# Patient Record
Sex: FEMALE | Race: WHITE | Employment: OTHER | URBAN - METROPOLITAN AREA
[De-identification: names, ages, dates, MRNs, and addresses within clinical notes are randomized per-mention and may not be internally consistent; named-entity substitution may affect disease eponyms.]

---

## 2017-01-06 DIAGNOSIS — E78.5 HYPERLIPIDEMIA WITH TARGET LDL LESS THAN 70: ICD-10-CM

## 2017-01-06 DIAGNOSIS — I77.9 PERIPHERAL ARTERIAL OCCLUSIVE DISEASE (HCC): ICD-10-CM

## 2017-01-06 DIAGNOSIS — F41.9 ANXIETY AND DEPRESSION: ICD-10-CM

## 2017-01-06 DIAGNOSIS — F32.A ANXIETY AND DEPRESSION: ICD-10-CM

## 2017-01-06 DIAGNOSIS — G62.9 NEUROPATHY: ICD-10-CM

## 2017-01-06 DIAGNOSIS — Z72.0 TOBACCO ABUSE: Primary | ICD-10-CM

## 2017-01-06 RX ORDER — CITALOPRAM 10 MG/1
TABLET ORAL
Qty: 90 TABLET | Refills: 1 | Status: SHIPPED | OUTPATIENT
Start: 2017-01-06 | End: 2017-04-03

## 2017-01-06 RX ORDER — AMLODIPINE BESYLATE 10 MG/1
10 TABLET ORAL
Qty: 90 TABLET | Refills: 1 | Status: SHIPPED | OUTPATIENT
Start: 2017-01-06 | End: 2017-07-13

## 2017-01-06 RX ORDER — LISINOPRIL 40 MG/1
40 TABLET ORAL DAILY
Qty: 90 TABLET | Refills: 1 | Status: SHIPPED | OUTPATIENT
Start: 2017-01-06 | End: 2017-09-07

## 2017-04-03 RX ORDER — CITALOPRAM 10 MG/1
TABLET ORAL
Qty: 90 TABLET | Refills: 1 | Status: SHIPPED | OUTPATIENT
Start: 2017-04-03 | End: 2018-01-08

## 2017-05-01 ENCOUNTER — OFFICE VISIT (OUTPATIENT)
Dept: INTERNAL MEDICINE CLINIC | Facility: CLINIC | Age: 82
End: 2017-05-01

## 2017-05-01 ENCOUNTER — APPOINTMENT (OUTPATIENT)
Dept: LAB | Age: 82
End: 2017-05-01
Attending: INTERNAL MEDICINE
Payer: MEDICARE

## 2017-05-01 VITALS
DIASTOLIC BLOOD PRESSURE: 50 MMHG | OXYGEN SATURATION: 99 % | SYSTOLIC BLOOD PRESSURE: 134 MMHG | BODY MASS INDEX: 23.6 KG/M2 | HEART RATE: 65 BPM | TEMPERATURE: 98 F | HEIGHT: 61 IN | WEIGHT: 125 LBS | RESPIRATION RATE: 16 BRPM

## 2017-05-01 DIAGNOSIS — F41.1 GENERALIZED ANXIETY DISORDER: ICD-10-CM

## 2017-05-01 DIAGNOSIS — G62.9 NEUROPATHY: ICD-10-CM

## 2017-05-01 DIAGNOSIS — I77.9 PERIPHERAL ARTERIAL OCCLUSIVE DISEASE (HCC): ICD-10-CM

## 2017-05-01 DIAGNOSIS — I15.0 RENOVASCULAR HYPERTENSION: Primary | ICD-10-CM

## 2017-05-01 DIAGNOSIS — I15.0 RENOVASCULAR HYPERTENSION: ICD-10-CM

## 2017-05-01 DIAGNOSIS — E78.5 HYPERLIPIDEMIA WITH TARGET LDL LESS THAN 70: ICD-10-CM

## 2017-05-01 DIAGNOSIS — Z89.512 STATUS POST BELOW KNEE AMPUTATION OF LEFT LOWER EXTREMITY: ICD-10-CM

## 2017-05-01 PROCEDURE — 84450 TRANSFERASE (AST) (SGOT): CPT

## 2017-05-01 PROCEDURE — 80048 BASIC METABOLIC PNL TOTAL CA: CPT

## 2017-05-01 PROCEDURE — 99214 OFFICE O/P EST MOD 30 MIN: CPT | Performed by: INTERNAL MEDICINE

## 2017-05-01 PROCEDURE — 36415 COLL VENOUS BLD VENIPUNCTURE: CPT

## 2017-05-01 PROCEDURE — 84460 ALANINE AMINO (ALT) (SGPT): CPT

## 2017-05-01 NOTE — PROGRESS NOTES
Christopher Servin is a 80year old female. HPI:   Patient presents for the following issues. 1. HTN: tolerating metoprolol, lisinopril, norvasc well. 2. HLP: tolerating lipitor well  3.  Anxiety: doing well on citalopram. She states her nervousness i disease) ([facility]    • Ovarian cancer ([facility] 1962     R ovary removed, cannot remember for sure.  No xrt/radiation   • Macular degeneration of both eyes    • Glaucoma           Past Surgical History    OTHER SURGICAL HISTORY Left     Comment BKA for PVDz      S Cancer Screening: declines colonoscopy  Breast Cancer Screening: declines breast cancer screening  Bone Health: DEXA ordered. Educated on calcium/vit D3, weight bearing exercises  Vaccines: Pneumovac 2014. Prevnar 13 in 2016. Educated on TDAP and shingles.

## 2017-07-13 ENCOUNTER — TELEPHONE (OUTPATIENT)
Dept: INTERNAL MEDICINE CLINIC | Facility: CLINIC | Age: 82
End: 2017-07-13

## 2017-07-13 DIAGNOSIS — F41.9 ANXIETY AND DEPRESSION: ICD-10-CM

## 2017-07-13 DIAGNOSIS — G62.9 NEUROPATHY: ICD-10-CM

## 2017-07-13 DIAGNOSIS — Z72.0 TOBACCO ABUSE: ICD-10-CM

## 2017-07-13 DIAGNOSIS — F32.A ANXIETY AND DEPRESSION: ICD-10-CM

## 2017-07-13 DIAGNOSIS — E78.5 HYPERLIPIDEMIA WITH TARGET LDL LESS THAN 70: ICD-10-CM

## 2017-07-13 DIAGNOSIS — I77.9 PERIPHERAL ARTERIAL OCCLUSIVE DISEASE (HCC): ICD-10-CM

## 2017-07-13 RX ORDER — AMLODIPINE BESYLATE 10 MG/1
10 TABLET ORAL
Qty: 90 TABLET | Refills: 1 | Status: SHIPPED | OUTPATIENT
Start: 2017-07-13 | End: 2018-01-08

## 2017-07-31 DIAGNOSIS — F41.9 ANXIETY AND DEPRESSION: ICD-10-CM

## 2017-07-31 DIAGNOSIS — F32.A ANXIETY AND DEPRESSION: ICD-10-CM

## 2017-07-31 DIAGNOSIS — G62.9 NEUROPATHY: ICD-10-CM

## 2017-07-31 DIAGNOSIS — E78.5 HYPERLIPIDEMIA WITH TARGET LDL LESS THAN 70: ICD-10-CM

## 2017-07-31 DIAGNOSIS — Z72.0 TOBACCO ABUSE: ICD-10-CM

## 2017-07-31 DIAGNOSIS — I77.9 PERIPHERAL ARTERIAL OCCLUSIVE DISEASE (HCC): ICD-10-CM

## 2017-07-31 RX ORDER — ATORVASTATIN CALCIUM 40 MG/1
TABLET, FILM COATED ORAL
Qty: 90 TABLET | Refills: 3 | Status: SHIPPED | OUTPATIENT
Start: 2017-07-31 | End: 2018-08-18

## 2017-09-07 DIAGNOSIS — Z72.0 TOBACCO ABUSE: ICD-10-CM

## 2017-09-07 DIAGNOSIS — F41.9 ANXIETY AND DEPRESSION: ICD-10-CM

## 2017-09-07 DIAGNOSIS — F32.A ANXIETY AND DEPRESSION: ICD-10-CM

## 2017-09-07 DIAGNOSIS — E78.5 HYPERLIPIDEMIA WITH TARGET LDL LESS THAN 70: ICD-10-CM

## 2017-09-07 DIAGNOSIS — G62.9 NEUROPATHY: ICD-10-CM

## 2017-09-07 DIAGNOSIS — I77.9 PERIPHERAL ARTERIAL OCCLUSIVE DISEASE (HCC): ICD-10-CM

## 2017-09-07 RX ORDER — LISINOPRIL 40 MG/1
40 TABLET ORAL DAILY
Qty: 90 TABLET | Refills: 1 | Status: SHIPPED | OUTPATIENT
Start: 2017-09-07 | End: 2018-03-11

## 2017-10-05 NOTE — PATIENT INSTRUCTIONS
Please have your blood-work done as soon as possible.     You can return in November, 2017 for a medicare wellness exam. Access: peripheral only   F: No IVF.   E: Maintain K>4, Mg>2  N: Dash diet  DVT PPX: restart eliquis after procedure   Full code   CCU

## 2017-11-06 ENCOUNTER — TELEPHONE (OUTPATIENT)
Dept: INTERNAL MEDICINE CLINIC | Facility: CLINIC | Age: 82
End: 2017-11-06

## 2017-11-06 NOTE — TELEPHONE ENCOUNTER
Patient called same day to cancel appointment. Notified of 33-AUK cancellation policy and stated that this will be considered a NO SHOW. Stated that after a second no show within the year, there will be a $50 charge.   Patient rescheduled  Future Appointme

## 2017-11-13 ENCOUNTER — OFFICE VISIT (OUTPATIENT)
Dept: INTERNAL MEDICINE CLINIC | Facility: CLINIC | Age: 82
End: 2017-11-13

## 2017-11-13 ENCOUNTER — APPOINTMENT (OUTPATIENT)
Dept: LAB | Age: 82
End: 2017-11-13
Attending: PHYSICIAN ASSISTANT
Payer: MEDICARE

## 2017-11-13 VITALS
OXYGEN SATURATION: 97 % | HEIGHT: 61 IN | WEIGHT: 128.5 LBS | BODY MASS INDEX: 24.26 KG/M2 | SYSTOLIC BLOOD PRESSURE: 146 MMHG | TEMPERATURE: 98 F | RESPIRATION RATE: 12 BRPM | DIASTOLIC BLOOD PRESSURE: 50 MMHG | HEART RATE: 62 BPM

## 2017-11-13 DIAGNOSIS — Z89.512 STATUS POST BELOW KNEE AMPUTATION OF LEFT LOWER EXTREMITY: ICD-10-CM

## 2017-11-13 DIAGNOSIS — I77.9 PERIPHERAL ARTERIAL OCCLUSIVE DISEASE (HCC): ICD-10-CM

## 2017-11-13 DIAGNOSIS — Z00.00 ENCOUNTER FOR ANNUAL HEALTH EXAMINATION: Primary | ICD-10-CM

## 2017-11-13 DIAGNOSIS — I15.0 RENOVASCULAR HYPERTENSION: ICD-10-CM

## 2017-11-13 DIAGNOSIS — G62.9 NEUROPATHY: ICD-10-CM

## 2017-11-13 DIAGNOSIS — F41.1 GENERALIZED ANXIETY DISORDER: ICD-10-CM

## 2017-11-13 DIAGNOSIS — Z13.31 DEPRESSION SCREENING: ICD-10-CM

## 2017-11-13 DIAGNOSIS — E78.5 HYPERLIPIDEMIA WITH TARGET LDL LESS THAN 70: ICD-10-CM

## 2017-11-13 DIAGNOSIS — Z00.00 ENCOUNTER FOR ANNUAL HEALTH EXAMINATION: ICD-10-CM

## 2017-11-13 PROCEDURE — G0444 DEPRESSION SCREEN ANNUAL: HCPCS | Performed by: PHYSICIAN ASSISTANT

## 2017-11-13 PROCEDURE — G0439 PPPS, SUBSEQ VISIT: HCPCS | Performed by: PHYSICIAN ASSISTANT

## 2017-11-13 PROCEDURE — 80048 BASIC METABOLIC PNL TOTAL CA: CPT

## 2017-11-13 PROCEDURE — 84450 TRANSFERASE (AST) (SGOT): CPT

## 2017-11-13 PROCEDURE — G0008 ADMIN INFLUENZA VIRUS VAC: HCPCS | Performed by: PHYSICIAN ASSISTANT

## 2017-11-13 PROCEDURE — 90653 IIV ADJUVANT VACCINE IM: CPT | Performed by: PHYSICIAN ASSISTANT

## 2017-11-13 PROCEDURE — 36415 COLL VENOUS BLD VENIPUNCTURE: CPT

## 2017-11-13 PROCEDURE — 80061 LIPID PANEL: CPT

## 2017-11-13 PROCEDURE — 84460 ALANINE AMINO (ALT) (SGPT): CPT

## 2017-11-13 NOTE — PROGRESS NOTES
HPI:   Clare Gallardo is a 80year old female who presents for a Medicare Subsequent Annual Wellness visit (Pt already had Initial Annual Wellness). HTN & HLP - compliant with meds, no SEs. Home BP readings in the 120-130s/60-70s. Mood - stable. Dorzolamide HCl-Timolol Mal (DORZOLAMIDE-TIMOLOL) 22.3-6.8 MG/ML Ophthalmic Solution    Multiple Vitamins-Minerals (PRESERVISION AREDS) Oral Tab Take  by mouth.   latanoprost (XALATAN) 0.005 % Ophthalmic Solution Place 1 drop into both eyes daily.    aspi Hearing Assessment  (Required for AWV/SWV)    Finger Rub     Visual Acuity  Right Eye Visual Acuity: Uncorrected Right Eye Chart Acuity: 20/200   Left Eye Visual Acuity: Uncorrected Left Eye Chart Acuity: 20/200   Both Eyes Visual Acuity: Uncorrected Both  on file in [facility]:    Red Fajardo does not have a Power of  for [employer] on file in [address] [place] [address] Rd. Discussed with patient and provided information           PLAN:  The patient indicates understanding of these issues and agrees to the plan. weeks)?: Not at all    PHQ-2 SCORE: 0        Advance Directives     Do you have a healthcare power of ?: Yes    Do you have a living will?: Yes     Please go to \"Cognitive Assessment\" under Medicare Assessment section in Charting, test patient an Pap: Every 3 yrs age 21-65 or Pap+HPV every 5 yrs age 33-67, age 72 and older at high risk There are no preventive care reminders to display for this patient.  Update Health Maintenance if applicable    Chlamydia  Annually if high risk No results found f Creatinine (mg/dL)   Date Value   05/01/2017 0.94    No flowsheet data found. BUN  Annually BUN (mg/dL)   Date Value   05/01/2017 21 (H)   12/02/2013 10    No flowsheet data found.      Drug Serum Conc  Annually No results found for: DIGOXIN, DIG, [place] Educated on TDAP and shingles. Gets annual flu shot. Healthcare Living Will, Medical POA:  DNR/DNI.   Son is main contact: Teresa Cárdenas Team:  Cardiology- Dr. Alexandra Zhang  Vascular- Dr. Luz Lake    The patient indicates understanding of the

## 2017-11-13 NOTE — PATIENT INSTRUCTIONS
Continue current medications. Try to quit smoking with your son. Follow up visit in 6 months (May 2017). Recommended Websites for Advanced Directives    [email or web address] org/[email or web address]. pdf  An information packet, including

## 2018-01-08 DIAGNOSIS — E78.5 HYPERLIPIDEMIA WITH TARGET LDL LESS THAN 70: ICD-10-CM

## 2018-01-08 DIAGNOSIS — F41.9 ANXIETY AND DEPRESSION: ICD-10-CM

## 2018-01-08 DIAGNOSIS — I77.9 PERIPHERAL ARTERIAL OCCLUSIVE DISEASE (HCC): ICD-10-CM

## 2018-01-08 DIAGNOSIS — F32.A ANXIETY AND DEPRESSION: ICD-10-CM

## 2018-01-08 DIAGNOSIS — Z72.0 TOBACCO ABUSE: ICD-10-CM

## 2018-01-08 DIAGNOSIS — G62.9 NEUROPATHY: ICD-10-CM

## 2018-01-09 RX ORDER — CITALOPRAM 10 MG/1
TABLET ORAL
Qty: 90 TABLET | Refills: 1 | Status: SHIPPED | OUTPATIENT
Start: 2018-01-09 | End: 2018-08-28

## 2018-01-09 RX ORDER — AMLODIPINE BESYLATE 10 MG/1
10 TABLET ORAL
Qty: 90 TABLET | Refills: 1 | Status: SHIPPED | OUTPATIENT
Start: 2018-01-09 | End: 2018-06-15

## 2018-03-11 DIAGNOSIS — Z72.0 TOBACCO ABUSE: ICD-10-CM

## 2018-03-11 DIAGNOSIS — I77.9 PERIPHERAL ARTERIAL OCCLUSIVE DISEASE (HCC): ICD-10-CM

## 2018-03-11 DIAGNOSIS — F32.A ANXIETY AND DEPRESSION: ICD-10-CM

## 2018-03-11 DIAGNOSIS — G62.9 NEUROPATHY: ICD-10-CM

## 2018-03-11 DIAGNOSIS — E78.5 HYPERLIPIDEMIA WITH TARGET LDL LESS THAN 70: ICD-10-CM

## 2018-03-11 DIAGNOSIS — F41.9 ANXIETY AND DEPRESSION: ICD-10-CM

## 2018-03-13 RX ORDER — LISINOPRIL 40 MG/1
40 TABLET ORAL DAILY
Qty: 90 TABLET | Refills: 1 | Status: SHIPPED | OUTPATIENT
Start: 2018-03-13 | End: 2018-08-27

## 2018-05-14 ENCOUNTER — APPOINTMENT (OUTPATIENT)
Dept: LAB | Age: 83
End: 2018-05-14
Attending: INTERNAL MEDICINE
Payer: MEDICARE

## 2018-05-14 ENCOUNTER — OFFICE VISIT (OUTPATIENT)
Dept: INTERNAL MEDICINE CLINIC | Facility: CLINIC | Age: 83
End: 2018-05-14

## 2018-05-14 VITALS
SYSTOLIC BLOOD PRESSURE: 138 MMHG | RESPIRATION RATE: 16 BRPM | BODY MASS INDEX: 25.49 KG/M2 | HEART RATE: 60 BPM | DIASTOLIC BLOOD PRESSURE: 40 MMHG | TEMPERATURE: 99 F | HEIGHT: 61 IN | WEIGHT: 135 LBS

## 2018-05-14 DIAGNOSIS — F41.1 GENERALIZED ANXIETY DISORDER: ICD-10-CM

## 2018-05-14 DIAGNOSIS — F17.200 TOBACCO DEPENDENCE: ICD-10-CM

## 2018-05-14 DIAGNOSIS — I10 ESSENTIAL HYPERTENSION: Primary | ICD-10-CM

## 2018-05-14 DIAGNOSIS — I10 ESSENTIAL HYPERTENSION: ICD-10-CM

## 2018-05-14 DIAGNOSIS — I77.9 PERIPHERAL ARTERIAL OCCLUSIVE DISEASE (HCC): ICD-10-CM

## 2018-05-14 DIAGNOSIS — Z89.512 STATUS POST BELOW KNEE AMPUTATION OF LEFT LOWER EXTREMITY: ICD-10-CM

## 2018-05-14 DIAGNOSIS — E78.5 HYPERLIPIDEMIA WITH TARGET LDL LESS THAN 70: ICD-10-CM

## 2018-05-14 PROCEDURE — 84460 ALANINE AMINO (ALT) (SGPT): CPT

## 2018-05-14 PROCEDURE — 99214 OFFICE O/P EST MOD 30 MIN: CPT | Performed by: INTERNAL MEDICINE

## 2018-05-14 PROCEDURE — 36415 COLL VENOUS BLD VENIPUNCTURE: CPT

## 2018-05-14 PROCEDURE — 84450 TRANSFERASE (AST) (SGOT): CPT

## 2018-05-14 PROCEDURE — 80048 BASIC METABOLIC PNL TOTAL CA: CPT

## 2018-05-14 RX ORDER — DORZOLAMIDE HYDROCHLORIDE AND TIMOLOL MALEATE 20; 5 MG/ML; MG/ML
SOLUTION/ DROPS OPHTHALMIC
COMMUNITY
Start: 2017-07-10 | End: 2018-05-14

## 2018-05-14 RX ORDER — LATANOPROST 50 UG/ML
1 SOLUTION/ DROPS OPHTHALMIC
COMMUNITY
Start: 2018-02-08 | End: 2018-05-14

## 2018-05-14 NOTE — PROGRESS NOTES
Morenita Tapia is a 80year old female. HPI:   Patient presents for the following issues. Requesting labs. 1. HTN: home pressures are < 140/90s. Taking all her medications daily. Tolerating them well.  She was very worried about getting in here on Paternal Grandmother      colon cancer   • Cancer Daughter       Past Medical History:   Diagnosis Date   • Glaucoma    • Macular degeneration of both eyes    • Other and unspecified hyperlipidemia    • Ovarian cancer ([facility] [address] 1962    R ovary removed, cannot interested in cessation. Only smoking 1-2 cig per day. Discussed nicotine gum/lozenges. # LLE Neuropathy: since amputation. chronic, able to tolerate. Off gabapentin now  # Ovarian Cancer: s/p surgery in 1962 but cannot remember which side.  Needs yearly

## 2018-05-25 ENCOUNTER — APPOINTMENT (OUTPATIENT)
Dept: LAB | Age: 83
End: 2018-05-25
Attending: INTERNAL MEDICINE
Payer: MEDICARE

## 2018-05-25 DIAGNOSIS — I10 ESSENTIAL HYPERTENSION: ICD-10-CM

## 2018-05-25 DIAGNOSIS — I77.9 PERIPHERAL ARTERIAL OCCLUSIVE DISEASE (HCC): ICD-10-CM

## 2018-05-25 DIAGNOSIS — G62.9 NEUROPATHY: ICD-10-CM

## 2018-05-25 DIAGNOSIS — R73.01 IMPAIRED FASTING GLUCOSE: ICD-10-CM

## 2018-05-25 DIAGNOSIS — F17.200 TOBACCO DEPENDENCE: ICD-10-CM

## 2018-05-25 PROCEDURE — 83036 HEMOGLOBIN GLYCOSYLATED A1C: CPT

## 2018-05-25 PROCEDURE — 36415 COLL VENOUS BLD VENIPUNCTURE: CPT

## 2018-06-15 DIAGNOSIS — E78.5 HYPERLIPIDEMIA WITH TARGET LDL LESS THAN 70: ICD-10-CM

## 2018-06-15 DIAGNOSIS — F32.A ANXIETY AND DEPRESSION: ICD-10-CM

## 2018-06-15 DIAGNOSIS — G62.9 NEUROPATHY: ICD-10-CM

## 2018-06-15 DIAGNOSIS — F41.9 ANXIETY AND DEPRESSION: ICD-10-CM

## 2018-06-15 DIAGNOSIS — Z72.0 TOBACCO ABUSE: ICD-10-CM

## 2018-06-15 DIAGNOSIS — I77.9 PERIPHERAL ARTERIAL OCCLUSIVE DISEASE (HCC): ICD-10-CM

## 2018-06-15 RX ORDER — AMLODIPINE BESYLATE 10 MG/1
10 TABLET ORAL DAILY
Qty: 90 TABLET | Refills: 1 | Status: SHIPPED | OUTPATIENT
Start: 2018-06-15 | End: 2018-08-27

## 2018-06-20 DIAGNOSIS — Z72.0 TOBACCO ABUSE: ICD-10-CM

## 2018-06-20 DIAGNOSIS — F32.A ANXIETY AND DEPRESSION: ICD-10-CM

## 2018-06-20 DIAGNOSIS — G62.9 NEUROPATHY: ICD-10-CM

## 2018-06-20 DIAGNOSIS — F41.9 ANXIETY AND DEPRESSION: ICD-10-CM

## 2018-06-20 DIAGNOSIS — E78.5 HYPERLIPIDEMIA WITH TARGET LDL LESS THAN 70: ICD-10-CM

## 2018-06-20 DIAGNOSIS — I77.9 PERIPHERAL ARTERIAL OCCLUSIVE DISEASE (HCC): ICD-10-CM

## 2018-06-21 RX ORDER — AMLODIPINE BESYLATE 10 MG/1
10 TABLET ORAL DAILY
Qty: 90 TABLET | Refills: 1 | OUTPATIENT
Start: 2018-06-21

## 2018-06-21 NOTE — TELEPHONE ENCOUNTER
Refill requested: Amlodipine Besylate 10 mg     Passed protocol      Last refill: 6/15/18 #90 1R   Relevant Labs: BMP 5/14/18   BP Readings from Last 3 Encounters:  05/14/18 : 138/40  11/13/17 : 146/50  05/01/17 : 134/50      Last OV / RTC advised: 5/14/18

## 2018-08-09 DIAGNOSIS — Z72.0 TOBACCO ABUSE: ICD-10-CM

## 2018-08-09 DIAGNOSIS — F41.9 ANXIETY AND DEPRESSION: ICD-10-CM

## 2018-08-09 DIAGNOSIS — G62.9 NEUROPATHY: ICD-10-CM

## 2018-08-09 DIAGNOSIS — I77.9 PERIPHERAL ARTERIAL OCCLUSIVE DISEASE (HCC): ICD-10-CM

## 2018-08-09 DIAGNOSIS — F32.A ANXIETY AND DEPRESSION: ICD-10-CM

## 2018-08-09 DIAGNOSIS — E78.5 HYPERLIPIDEMIA WITH TARGET LDL LESS THAN 70: ICD-10-CM

## 2018-08-09 RX ORDER — ATORVASTATIN CALCIUM 40 MG/1
TABLET, FILM COATED ORAL
Qty: 90 TABLET | Refills: 2 | OUTPATIENT
Start: 2018-08-09

## 2018-08-18 DIAGNOSIS — I77.9 PERIPHERAL ARTERIAL OCCLUSIVE DISEASE (HCC): ICD-10-CM

## 2018-08-18 DIAGNOSIS — G62.9 NEUROPATHY: ICD-10-CM

## 2018-08-18 DIAGNOSIS — Z72.0 TOBACCO ABUSE: ICD-10-CM

## 2018-08-18 DIAGNOSIS — F32.A ANXIETY AND DEPRESSION: ICD-10-CM

## 2018-08-18 DIAGNOSIS — F41.9 ANXIETY AND DEPRESSION: ICD-10-CM

## 2018-08-18 DIAGNOSIS — E78.5 HYPERLIPIDEMIA WITH TARGET LDL LESS THAN 70: ICD-10-CM

## 2018-08-20 RX ORDER — ATORVASTATIN CALCIUM 40 MG/1
TABLET, FILM COATED ORAL
Qty: 90 TABLET | Refills: 0 | Status: SHIPPED | OUTPATIENT
Start: 2018-08-20 | End: 2018-08-27

## 2018-08-28 ENCOUNTER — HOSPITAL ENCOUNTER (INPATIENT)
Facility: HOSPITAL | Age: 83
LOS: 3 days | Discharge: NURSING, REHAB OR LONG-TERM CARE | DRG: 470 | End: 2018-08-31
Attending: EMERGENCY MEDICINE | Admitting: HOSPITALIST
Payer: MEDICARE

## 2018-08-28 ENCOUNTER — APPOINTMENT (OUTPATIENT)
Dept: GENERAL RADIOLOGY | Facility: HOSPITAL | Age: 83
DRG: 470 | End: 2018-08-28
Attending: EMERGENCY MEDICINE
Payer: MEDICARE

## 2018-08-28 DIAGNOSIS — I73.9 PVD (PERIPHERAL VASCULAR DISEASE) (HCC): ICD-10-CM

## 2018-08-28 DIAGNOSIS — E78.5 HYPERLIPIDEMIA WITH TARGET LDL LESS THAN 70: ICD-10-CM

## 2018-08-28 DIAGNOSIS — I77.9 PERIPHERAL ARTERIAL OCCLUSIVE DISEASE (HCC): ICD-10-CM

## 2018-08-28 DIAGNOSIS — S72.009A HIP FRACTURE (HCC): ICD-10-CM

## 2018-08-28 DIAGNOSIS — Z72.0 TOBACCO ABUSE: ICD-10-CM

## 2018-08-28 DIAGNOSIS — F32.A ANXIETY AND DEPRESSION: ICD-10-CM

## 2018-08-28 DIAGNOSIS — F41.9 ANXIETY AND DEPRESSION: ICD-10-CM

## 2018-08-28 DIAGNOSIS — S72.002A CLOSED FRACTURE OF LEFT HIP, INITIAL ENCOUNTER (HCC): Primary | ICD-10-CM

## 2018-08-28 DIAGNOSIS — G62.9 NEUROPATHY: ICD-10-CM

## 2018-08-28 LAB
ALBUMIN SERPL-MCNC: 3.6 G/DL (ref 3.5–4.8)
ALBUMIN/GLOB SERPL: 0.9 {RATIO} (ref 1–2)
ALP LIVER SERPL-CCNC: 81 U/L (ref 55–142)
ALT SERPL-CCNC: 24 U/L (ref 14–54)
ANION GAP SERPL CALC-SCNC: 11 MMOL/L (ref 0–18)
APTT PPP: 29.5 SECONDS (ref 26.1–34.6)
AST SERPL-CCNC: 24 U/L (ref 15–41)
ATRIAL RATE: 59 BPM
BASOPHILS # BLD AUTO: 0.04 X10(3) UL (ref 0–0.1)
BASOPHILS NFR BLD AUTO: 0.5 %
BILIRUB SERPL-MCNC: 0.4 MG/DL (ref 0.1–2)
BUN BLD-MCNC: 17 MG/DL (ref 8–20)
BUN/CREAT SERPL: 20.5 (ref 10–20)
CALCIUM BLD-MCNC: 9.4 MG/DL (ref 8.3–10.3)
CHLORIDE SERPL-SCNC: 106 MMOL/L (ref 101–111)
CO2 SERPL-SCNC: 23 MMOL/L (ref 22–32)
CREAT BLD-MCNC: 0.83 MG/DL (ref 0.55–1.02)
EOSINOPHIL # BLD AUTO: 0.08 X10(3) UL (ref 0–0.3)
EOSINOPHIL NFR BLD AUTO: 1 %
ERYTHROCYTE [DISTWIDTH] IN BLOOD BY AUTOMATED COUNT: 13.1 % (ref 11.5–16)
GLOBULIN PLAS-MCNC: 3.8 G/DL (ref 2.5–4)
GLUCOSE BLD-MCNC: 107 MG/DL (ref 70–99)
HCT VFR BLD AUTO: 38.1 % (ref 34–50)
HGB BLD-MCNC: 12.7 G/DL (ref 12–16)
IMMATURE GRANULOCYTE COUNT: 0.07 X10(3) UL (ref 0–1)
IMMATURE GRANULOCYTE RATIO %: 0.9 %
INR BLD: 1.06 (ref 0.9–1.1)
LYMPHOCYTES # BLD AUTO: 1.56 X10(3) UL (ref 0.9–4)
LYMPHOCYTES NFR BLD AUTO: 19.1 %
M PROTEIN MFR SERPL ELPH: 7.4 G/DL (ref 6.1–8.3)
MCH RBC QN AUTO: 30.8 PG (ref 27–33.2)
MCHC RBC AUTO-ENTMCNC: 33.3 G/DL (ref 31–37)
MCV RBC AUTO: 92.3 FL (ref 81–100)
MONOCYTES # BLD AUTO: 0.68 X10(3) UL (ref 0.1–1)
MONOCYTES NFR BLD AUTO: 8.3 %
NEUTROPHIL ABS PRELIM: 5.73 X10 (3) UL (ref 1.3–6.7)
NEUTROPHILS # BLD AUTO: 5.73 X10(3) UL (ref 1.3–6.7)
NEUTROPHILS NFR BLD AUTO: 70.2 %
OSMOLALITY SERPL CALC.SUM OF ELEC: 292 MOSM/KG (ref 275–295)
P AXIS: 63 DEGREES
P-R INTERVAL: 132 MS
PLATELET # BLD AUTO: 281 10(3)UL (ref 150–450)
POTASSIUM SERPL-SCNC: 4 MMOL/L (ref 3.6–5.1)
PSA SERPL DL<=0.01 NG/ML-MCNC: 14.2 SECONDS (ref 12.4–14.7)
Q-T INTERVAL: 458 MS
QRS DURATION: 92 MS
QTC CALCULATION (BEZET): 453 MS
R AXIS: -12 DEGREES
RBC # BLD AUTO: 4.13 X10(6)UL (ref 3.8–5.1)
RED CELL DISTRIBUTION WIDTH-SD: 44.5 FL (ref 35.1–46.3)
SODIUM SERPL-SCNC: 140 MMOL/L (ref 136–144)
T AXIS: 43 DEGREES
VENTRICULAR RATE: 59 BPM
WBC # BLD AUTO: 8.2 X10(3) UL (ref 4–13)

## 2018-08-28 PROCEDURE — 73502 X-RAY EXAM HIP UNI 2-3 VIEWS: CPT | Performed by: EMERGENCY MEDICINE

## 2018-08-28 PROCEDURE — 99223 1ST HOSP IP/OBS HIGH 75: CPT | Performed by: HOSPITALIST

## 2018-08-28 PROCEDURE — 71045 X-RAY EXAM CHEST 1 VIEW: CPT | Performed by: EMERGENCY MEDICINE

## 2018-08-28 RX ORDER — MORPHINE SULFATE 4 MG/ML
2 INJECTION, SOLUTION INTRAMUSCULAR; INTRAVENOUS EVERY 4 HOURS PRN
Status: DISCONTINUED | OUTPATIENT
Start: 2018-08-28 | End: 2018-08-29

## 2018-08-28 RX ORDER — MORPHINE SULFATE 4 MG/ML
1-2 INJECTION, SOLUTION INTRAMUSCULAR; INTRAVENOUS EVERY 4 HOURS PRN
Status: DISCONTINUED | OUTPATIENT
Start: 2018-08-28 | End: 2018-08-28 | Stop reason: SDUPTHER

## 2018-08-28 RX ORDER — ATORVASTATIN CALCIUM 40 MG/1
40 TABLET, FILM COATED ORAL NIGHTLY
Status: DISCONTINUED | OUTPATIENT
Start: 2018-08-29 | End: 2018-08-31

## 2018-08-28 RX ORDER — CITALOPRAM 10 MG/1
10 TABLET ORAL DAILY
COMMUNITY
End: 2018-12-06

## 2018-08-28 RX ORDER — ACETAMINOPHEN 325 MG/1
650 TABLET ORAL EVERY 6 HOURS PRN
Status: DISCONTINUED | OUTPATIENT
Start: 2018-08-28 | End: 2018-08-29

## 2018-08-28 RX ORDER — METOCLOPRAMIDE HYDROCHLORIDE 5 MG/ML
5 INJECTION INTRAMUSCULAR; INTRAVENOUS EVERY 8 HOURS PRN
Status: DISCONTINUED | OUTPATIENT
Start: 2018-08-28 | End: 2018-08-31

## 2018-08-28 RX ORDER — AMLODIPINE BESYLATE 5 MG/1
10 TABLET ORAL DAILY
Status: DISCONTINUED | OUTPATIENT
Start: 2018-08-29 | End: 2018-08-31

## 2018-08-28 RX ORDER — SODIUM CHLORIDE, SODIUM LACTATE, POTASSIUM CHLORIDE, CALCIUM CHLORIDE 600; 310; 30; 20 MG/100ML; MG/100ML; MG/100ML; MG/100ML
INJECTION, SOLUTION INTRAVENOUS CONTINUOUS
Status: DISCONTINUED | OUTPATIENT
Start: 2018-08-29 | End: 2018-08-31

## 2018-08-28 RX ORDER — TRAZODONE HYDROCHLORIDE 50 MG/1
50 TABLET ORAL NIGHTLY PRN
Status: DISCONTINUED | OUTPATIENT
Start: 2018-08-28 | End: 2018-08-31

## 2018-08-28 RX ORDER — MORPHINE SULFATE 4 MG/ML
1 INJECTION, SOLUTION INTRAMUSCULAR; INTRAVENOUS EVERY 4 HOURS PRN
Status: DISCONTINUED | OUTPATIENT
Start: 2018-08-28 | End: 2018-08-29

## 2018-08-28 RX ORDER — METOPROLOL TARTRATE 50 MG/1
75 TABLET, FILM COATED ORAL 2 TIMES DAILY
COMMUNITY
End: 2018-12-06

## 2018-08-28 RX ORDER — ATORVASTATIN CALCIUM 40 MG/1
40 TABLET, FILM COATED ORAL NIGHTLY
COMMUNITY
End: 2018-12-06

## 2018-08-28 RX ORDER — ENOXAPARIN SODIUM 100 MG/ML
40 INJECTION SUBCUTANEOUS DAILY
Status: DISCONTINUED | OUTPATIENT
Start: 2018-08-30 | End: 2018-08-31

## 2018-08-28 RX ORDER — ONDANSETRON 2 MG/ML
4 INJECTION INTRAMUSCULAR; INTRAVENOUS EVERY 6 HOURS PRN
Status: DISCONTINUED | OUTPATIENT
Start: 2018-08-28 | End: 2018-08-29

## 2018-08-28 RX ORDER — LATANOPROST 50 UG/ML
1 SOLUTION/ DROPS OPHTHALMIC NIGHTLY
Status: DISCONTINUED | OUTPATIENT
Start: 2018-08-28 | End: 2018-08-31

## 2018-08-28 RX ORDER — ESCITALOPRAM OXALATE 5 MG/1
5 TABLET ORAL DAILY
Status: DISCONTINUED | OUTPATIENT
Start: 2018-08-29 | End: 2018-08-31

## 2018-08-28 RX ORDER — HYDROCODONE BITARTRATE AND ACETAMINOPHEN 5; 325 MG/1; MG/1
1 TABLET ORAL EVERY 6 HOURS PRN
Status: DISCONTINUED | OUTPATIENT
Start: 2018-08-28 | End: 2018-08-29

## 2018-08-28 RX ORDER — DORZOLAMIDE HYDROCHLORIDE AND TIMOLOL MALEATE 20; 5 MG/ML; MG/ML
1 SOLUTION/ DROPS OPHTHALMIC 2 TIMES DAILY
Status: DISCONTINUED | OUTPATIENT
Start: 2018-08-28 | End: 2018-08-31

## 2018-08-28 NOTE — PROGRESS NOTES
[facility] Pharmacy Note:  Renal Dose Adjustment for Metoclopramide (REGLAN)    Argelia Erazo has been prescribed Metoclopramide (REGLAN) 10 mg every 8 hours as needed for n/v.    Estimated Creatinine Clearance: 36.7 mL/min (based on SCr of 0.83 mg/dL).     H

## 2018-08-28 NOTE — H&P
[facility]  History and Physical     Kia Salinas Patient Status:  Emergency    1932 MRN [id number]   Location [facility] Attending Rojas Bess MD   Hosp Day # 0 PCP Jeremias Ramirez MD     Chief Complaint: 3   atorvastatin 40 MG Oral Tab TAKE 1 TABLET BY MOUTH ONE TIME A DAY Disp: 90 tablet Rfl: 3   aspirin 325 MG Oral Tab Take 1 tablet by mouth every morning. Disp:  Rfl:    CITALOPRAM HYDROBROMIDE 10 MG Oral Tab TAKE 1 TABLET (10 MG TOTAL) BY MOUTH DAILY.  D most recent lab result is older than the maximum 7 days allowed. ). No results for input(s): PTP, INR in the last 168 hours. No results for input(s): TROP, CK in the last 168 hours. Imaging: Imaging data reviewed in Epic.       ASSESSMENT / PLAN:

## 2018-08-28 NOTE — ED PROVIDER NOTES
Patient Seen in: [facility] Emergency Department    History   Patient presents with:  Fall (musculoskeletal, neurologic)    Stated Complaint: fall. right groin pain    HPI    79-year-old female presents to the emerge department after a fall.   Rocael negative except as noted above.     Physical Exam   ED Triage Vitals [08/28/18 1331]  BP: 139/51  Pulse: 64  Resp: 16  Temp: 97.7 °F (36.5 °C)  Temp src: Temporal  SpO2: 99 %  O2 Device: None (Room air)    Current:/55 (BP Location: Left arm)   Pulse 6 orders were created for panel order CBC WITH DIFFERENTIAL WITH PLATELET.   Procedure                               Abnormality         Status                     ---------                               -----------         ------                     CBC W/ D Hospitalists and orthopedics        Disposition and Plan     Clinical Impression:  Closed fracture of left hip, initial encounter ([facility] [address]  (primary encounter diagnosis)  PVD (peripheral vascular disease) ([facility] [address]    Disposition:  Admit  8/28/2018  3:24 pm    Fo

## 2018-08-28 NOTE — ED NOTES
Pt report given to Vik Cortes. Pt states she's not in pain as long as she's just lying in bed. Awake and alert. Comfortable.

## 2018-08-28 NOTE — ED INITIAL ASSESSMENT (HPI)
Pt states she was bending and scraping the plate in the garbage. Lost her balance and fell. Pt with pain on her left groin.

## 2018-08-29 ENCOUNTER — APPOINTMENT (OUTPATIENT)
Dept: GENERAL RADIOLOGY | Facility: HOSPITAL | Age: 83
DRG: 470 | End: 2018-08-29
Attending: PHYSICIAN ASSISTANT
Payer: MEDICARE

## 2018-08-29 ENCOUNTER — ANESTHESIA EVENT (OUTPATIENT)
Dept: SURGERY | Facility: HOSPITAL | Age: 83
DRG: 470 | End: 2018-08-29
Payer: MEDICARE

## 2018-08-29 ENCOUNTER — ANESTHESIA (OUTPATIENT)
Dept: SURGERY | Facility: HOSPITAL | Age: 83
DRG: 470 | End: 2018-08-29
Payer: MEDICARE

## 2018-08-29 LAB
ANION GAP SERPL CALC-SCNC: 10 MMOL/L (ref 0–18)
ANTIBODY SCREEN: NEGATIVE
BASOPHILS # BLD AUTO: 0.05 X10(3) UL (ref 0–0.1)
BASOPHILS NFR BLD AUTO: 0.6 %
BILIRUB UR QL STRIP.AUTO: NEGATIVE
BUN BLD-MCNC: 18 MG/DL (ref 8–20)
BUN/CREAT SERPL: 20.9 (ref 10–20)
CALCIUM BLD-MCNC: 8.7 MG/DL (ref 8.3–10.3)
CHLORIDE SERPL-SCNC: 106 MMOL/L (ref 101–111)
CO2 SERPL-SCNC: 22 MMOL/L (ref 22–32)
COLOR UR AUTO: YELLOW
CREAT BLD-MCNC: 0.86 MG/DL (ref 0.55–1.02)
EOSINOPHIL # BLD AUTO: 0.13 X10(3) UL (ref 0–0.3)
EOSINOPHIL NFR BLD AUTO: 1.5 %
ERYTHROCYTE [DISTWIDTH] IN BLOOD BY AUTOMATED COUNT: 13.1 % (ref 11.5–16)
GLUCOSE BLD-MCNC: 115 MG/DL (ref 70–99)
GLUCOSE UR STRIP.AUTO-MCNC: NEGATIVE MG/DL
HCT VFR BLD AUTO: 34.8 % (ref 34–50)
HGB BLD-MCNC: 11.5 G/DL (ref 12–16)
IMMATURE GRANULOCYTE COUNT: 0.05 X10(3) UL (ref 0–1)
IMMATURE GRANULOCYTE RATIO %: 0.6 %
KETONES UR STRIP.AUTO-MCNC: NEGATIVE MG/DL
LYMPHOCYTES # BLD AUTO: 1.59 X10(3) UL (ref 0.9–4)
LYMPHOCYTES NFR BLD AUTO: 18.7 %
MCH RBC QN AUTO: 30.7 PG (ref 27–33.2)
MCHC RBC AUTO-ENTMCNC: 33 G/DL (ref 31–37)
MCV RBC AUTO: 92.8 FL (ref 81–100)
MONOCYTES # BLD AUTO: 0.77 X10(3) UL (ref 0.1–1)
MONOCYTES NFR BLD AUTO: 9 %
NEUTROPHIL ABS PRELIM: 5.93 X10 (3) UL (ref 1.3–6.7)
NEUTROPHILS # BLD AUTO: 5.93 X10(3) UL (ref 1.3–6.7)
NEUTROPHILS NFR BLD AUTO: 69.6 %
NITRITE UR QL STRIP.AUTO: NEGATIVE
OSMOLALITY SERPL CALC.SUM OF ELEC: 289 MOSM/KG (ref 275–295)
PH UR STRIP.AUTO: 5 [PH] (ref 4.5–8)
PLATELET # BLD AUTO: 224 10(3)UL (ref 150–450)
POTASSIUM SERPL-SCNC: 4.1 MMOL/L (ref 3.6–5.1)
PROT UR STRIP.AUTO-MCNC: NEGATIVE MG/DL
RBC # BLD AUTO: 3.75 X10(6)UL (ref 3.8–5.1)
RBC UR QL AUTO: NEGATIVE
RED CELL DISTRIBUTION WIDTH-SD: 44.3 FL (ref 35.1–46.3)
RH BLOOD TYPE: POSITIVE
SODIUM SERPL-SCNC: 138 MMOL/L (ref 136–144)
SP GR UR STRIP.AUTO: 1.01 (ref 1–1.03)
UROBILINOGEN UR STRIP.AUTO-MCNC: <2 MG/DL
WBC # BLD AUTO: 8.5 X10(3) UL (ref 4–13)
WBC #/AREA URNS AUTO: >50 /HPF

## 2018-08-29 PROCEDURE — 3E0T3BZ INTRODUCTION OF ANESTHETIC AGENT INTO PERIPHERAL NERVES AND PLEXI, PERCUTANEOUS APPROACH: ICD-10-PCS | Performed by: ANESTHESIOLOGY

## 2018-08-29 PROCEDURE — 73501 X-RAY EXAM HIP UNI 1 VIEW: CPT | Performed by: PHYSICIAN ASSISTANT

## 2018-08-29 PROCEDURE — 0SRR0J9 REPLACEMENT OF RIGHT HIP JOINT, FEMORAL SURFACE WITH SYNTHETIC SUBSTITUTE, CEMENTED, OPEN APPROACH: ICD-10-PCS | Performed by: ORTHOPAEDIC SURGERY

## 2018-08-29 PROCEDURE — 99232 SBSQ HOSP IP/OBS MODERATE 35: CPT | Performed by: HOSPITALIST

## 2018-08-29 DEVICE — IMPLANTABLE DEVICE: Type: IMPLANTABLE DEVICE | Site: HIP | Status: FUNCTIONAL

## 2018-08-29 DEVICE — VERSYS DISTAL CENTRALIZER 11MM: Type: IMPLANTABLE DEVICE | Site: HIP | Status: FUNCTIONAL

## 2018-08-29 DEVICE — VERSYS CEM LD/FX SZ 11X120MM: Type: IMPLANTABLE DEVICE | Site: HIP | Status: FUNCTIONAL

## 2018-08-29 DEVICE — KIT FEM BONE CEMENT RESTRICTOR: Type: IMPLANTABLE DEVICE | Site: HIP | Status: FUNCTIONAL

## 2018-08-29 DEVICE — REFOBACIN BC R 1X40 US: Type: IMPLANTABLE DEVICE | Site: HIP | Status: FUNCTIONAL

## 2018-08-29 RX ORDER — SODIUM CHLORIDE 9 MG/ML
INJECTION, SOLUTION INTRAVENOUS CONTINUOUS
Status: DISCONTINUED | OUTPATIENT
Start: 2018-08-29 | End: 2018-08-31

## 2018-08-29 RX ORDER — ONDANSETRON 2 MG/ML
4 INJECTION INTRAMUSCULAR; INTRAVENOUS EVERY 4 HOURS PRN
Status: DISCONTINUED | OUTPATIENT
Start: 2018-08-29 | End: 2018-08-31

## 2018-08-29 RX ORDER — SENNOSIDES 8.6 MG
17.2 TABLET ORAL NIGHTLY
Status: DISCONTINUED | OUTPATIENT
Start: 2018-08-29 | End: 2018-08-31

## 2018-08-29 RX ORDER — ONDANSETRON 2 MG/ML
4 INJECTION INTRAMUSCULAR; INTRAVENOUS AS NEEDED
Status: DISCONTINUED | OUTPATIENT
Start: 2018-08-29 | End: 2018-08-29 | Stop reason: HOSPADM

## 2018-08-29 RX ORDER — MEPERIDINE HYDROCHLORIDE 25 MG/ML
12.5 INJECTION INTRAMUSCULAR; INTRAVENOUS; SUBCUTANEOUS AS NEEDED
Status: DISCONTINUED | OUTPATIENT
Start: 2018-08-29 | End: 2018-08-29 | Stop reason: HOSPADM

## 2018-08-29 RX ORDER — OXYCODONE HYDROCHLORIDE 5 MG/1
5 TABLET ORAL EVERY 4 HOURS PRN
Status: DISPENSED | OUTPATIENT
Start: 2018-08-29 | End: 2018-08-31

## 2018-08-29 RX ORDER — CEFAZOLIN SODIUM/WATER 2 G/20 ML
2 SYRINGE (ML) INTRAVENOUS EVERY 8 HOURS
Status: DISCONTINUED | OUTPATIENT
Start: 2018-08-29 | End: 2018-08-29

## 2018-08-29 RX ORDER — DIPHENHYDRAMINE HCL 25 MG
25 CAPSULE ORAL EVERY 4 HOURS PRN
Status: DISCONTINUED | OUTPATIENT
Start: 2018-08-29 | End: 2018-08-29

## 2018-08-29 RX ORDER — ACETAMINOPHEN 325 MG/1
650 TABLET ORAL 4 TIMES DAILY
Status: DISCONTINUED | OUTPATIENT
Start: 2018-08-29 | End: 2018-08-31

## 2018-08-29 RX ORDER — MORPHINE SULFATE 4 MG/ML
1.5 INJECTION, SOLUTION INTRAMUSCULAR; INTRAVENOUS EVERY 2 HOUR PRN
Status: DISCONTINUED | OUTPATIENT
Start: 2018-08-29 | End: 2018-08-31

## 2018-08-29 RX ORDER — MORPHINE SULFATE 4 MG/ML
2 INJECTION, SOLUTION INTRAMUSCULAR; INTRAVENOUS EVERY 5 MIN PRN
Status: DISCONTINUED | OUTPATIENT
Start: 2018-08-29 | End: 2018-08-29 | Stop reason: HOSPADM

## 2018-08-29 RX ORDER — MORPHINE SULFATE 4 MG/ML
1 INJECTION, SOLUTION INTRAMUSCULAR; INTRAVENOUS EVERY 2 HOUR PRN
Status: DISCONTINUED | OUTPATIENT
Start: 2018-08-29 | End: 2018-08-31

## 2018-08-29 RX ORDER — HYDROCODONE BITARTRATE AND ACETAMINOPHEN 5; 325 MG/1; MG/1
2 TABLET ORAL AS NEEDED
Status: DISCONTINUED | OUTPATIENT
Start: 2018-08-29 | End: 2018-08-29 | Stop reason: HOSPADM

## 2018-08-29 RX ORDER — BISACODYL 10 MG
10 SUPPOSITORY, RECTAL RECTAL
Status: DISCONTINUED | OUTPATIENT
Start: 2018-08-29 | End: 2018-08-31

## 2018-08-29 RX ORDER — SODIUM PHOSPHATE, DIBASIC AND SODIUM PHOSPHATE, MONOBASIC 7; 19 G/133ML; G/133ML
1 ENEMA RECTAL ONCE AS NEEDED
Status: DISCONTINUED | OUTPATIENT
Start: 2018-08-29 | End: 2018-08-31

## 2018-08-29 RX ORDER — ACETAMINOPHEN 325 MG/1
325 TABLET ORAL EVERY 4 HOURS PRN
Status: DISCONTINUED | OUTPATIENT
Start: 2018-08-29 | End: 2018-08-29 | Stop reason: HOSPADM

## 2018-08-29 RX ORDER — OXYCODONE HYDROCHLORIDE 5 MG/1
2.5 TABLET ORAL EVERY 4 HOURS PRN
Status: DISPENSED | OUTPATIENT
Start: 2018-08-29 | End: 2018-08-31

## 2018-08-29 RX ORDER — MIDAZOLAM HYDROCHLORIDE 1 MG/ML
1 INJECTION INTRAMUSCULAR; INTRAVENOUS EVERY 5 MIN PRN
Status: DISCONTINUED | OUTPATIENT
Start: 2018-08-29 | End: 2018-08-29 | Stop reason: HOSPADM

## 2018-08-29 RX ORDER — METOCLOPRAMIDE HYDROCHLORIDE 5 MG/ML
10 INJECTION INTRAMUSCULAR; INTRAVENOUS AS NEEDED
Status: DISCONTINUED | OUTPATIENT
Start: 2018-08-29 | End: 2018-08-29 | Stop reason: HOSPADM

## 2018-08-29 RX ORDER — DIPHENHYDRAMINE HYDROCHLORIDE 50 MG/ML
12.5 INJECTION INTRAMUSCULAR; INTRAVENOUS AS NEEDED
Status: DISCONTINUED | OUTPATIENT
Start: 2018-08-29 | End: 2018-08-29 | Stop reason: HOSPADM

## 2018-08-29 RX ORDER — ACETAMINOPHEN 325 MG/1
650 TABLET ORAL EVERY 4 HOURS PRN
Status: DISCONTINUED | OUTPATIENT
Start: 2018-08-29 | End: 2018-08-29 | Stop reason: HOSPADM

## 2018-08-29 RX ORDER — POLYETHYLENE GLYCOL 3350 17 G/17G
17 POWDER, FOR SOLUTION ORAL DAILY PRN
Status: DISCONTINUED | OUTPATIENT
Start: 2018-08-29 | End: 2018-08-31

## 2018-08-29 RX ORDER — HYDROCODONE BITARTRATE AND ACETAMINOPHEN 5; 325 MG/1; MG/1
1 TABLET ORAL AS NEEDED
Status: DISCONTINUED | OUTPATIENT
Start: 2018-08-29 | End: 2018-08-29 | Stop reason: HOSPADM

## 2018-08-29 RX ORDER — CEFAZOLIN SODIUM/WATER 2 G/20 ML
2 SYRINGE (ML) INTRAVENOUS ONCE
Status: COMPLETED | OUTPATIENT
Start: 2018-08-29 | End: 2018-08-29

## 2018-08-29 RX ORDER — MORPHINE SULFATE 4 MG/ML
2 INJECTION, SOLUTION INTRAMUSCULAR; INTRAVENOUS EVERY 2 HOUR PRN
Status: DISCONTINUED | OUTPATIENT
Start: 2018-08-29 | End: 2018-08-31

## 2018-08-29 RX ORDER — ACETAMINOPHEN 325 MG/1
TABLET ORAL
Status: COMPLETED
Start: 2018-08-29 | End: 2018-08-29

## 2018-08-29 RX ORDER — DOCUSATE SODIUM 100 MG/1
100 CAPSULE, LIQUID FILLED ORAL 2 TIMES DAILY
Status: DISCONTINUED | OUTPATIENT
Start: 2018-08-29 | End: 2018-08-31

## 2018-08-29 RX ORDER — TIZANIDINE 2 MG/1
2 TABLET ORAL 3 TIMES DAILY PRN
Status: DISCONTINUED | OUTPATIENT
Start: 2018-08-29 | End: 2018-08-31

## 2018-08-29 RX ORDER — SODIUM CHLORIDE, SODIUM LACTATE, POTASSIUM CHLORIDE, CALCIUM CHLORIDE 600; 310; 30; 20 MG/100ML; MG/100ML; MG/100ML; MG/100ML
INJECTION, SOLUTION INTRAVENOUS CONTINUOUS
Status: DISCONTINUED | OUTPATIENT
Start: 2018-08-29 | End: 2018-08-29 | Stop reason: HOSPADM

## 2018-08-29 RX ORDER — METOCLOPRAMIDE HYDROCHLORIDE 5 MG/ML
10 INJECTION INTRAMUSCULAR; INTRAVENOUS EVERY 6 HOURS PRN
Status: DISCONTINUED | OUTPATIENT
Start: 2018-08-29 | End: 2018-08-29

## 2018-08-29 RX ORDER — DIPHENHYDRAMINE HYDROCHLORIDE 50 MG/ML
25 INJECTION INTRAMUSCULAR; INTRAVENOUS ONCE AS NEEDED
Status: DISCONTINUED | OUTPATIENT
Start: 2018-08-29 | End: 2018-08-29

## 2018-08-29 RX ORDER — NALOXONE HYDROCHLORIDE 0.4 MG/ML
80 INJECTION, SOLUTION INTRAMUSCULAR; INTRAVENOUS; SUBCUTANEOUS AS NEEDED
Status: DISCONTINUED | OUTPATIENT
Start: 2018-08-29 | End: 2018-08-29 | Stop reason: HOSPADM

## 2018-08-29 RX ORDER — DIPHENHYDRAMINE HYDROCHLORIDE 50 MG/ML
12.5 INJECTION INTRAMUSCULAR; INTRAVENOUS EVERY 4 HOURS PRN
Status: DISCONTINUED | OUTPATIENT
Start: 2018-08-29 | End: 2018-08-29

## 2018-08-29 NOTE — PROGRESS NOTES
[facility] Pharmacy Note:  Renal Adjustment for cefazolin (ANCEF)    Dale Simpson is a 80year old female who has been prescribed cefazolin (ANCEF) 2 gm every 8 hrs. CrCl is estimated creatinine clearance is 35.4 mL/min (based on SCr of 0.86 mg/dL).  so the

## 2018-08-29 NOTE — ANESTHESIA POSTPROCEDURE EVALUATION
Natalie 122 Patient Status:  Inpatient   Age/Gender 80year old female MRN [id number]   [facility] SURGERY Attending Kali Ferguson MD   Hosp Day # 1 PCP Emma Arboleda MD       Anesthesia Post-op Note    Procedure(s):

## 2018-08-29 NOTE — CONSULTS
[facility]    PATIENT'S NAME: Valente Coronado   ATTENDING PHYSICIAN: NICCI Flynn PHYSICIAN: Charmaine Billingsley M.D.    PATIENT ACCOUNT#:   [de-identified]    LOCATION:  PACU [facility] PACU 11 EDWP 10  MEDICAL RECORD #:   [id number]       DATE OF 24.99.  HEENT:  Unremarkable. LUNGS:  Clear. HEART:  Normal S1, S2, without murmur. ABDOMEN:  Benign. EXTREMITIES:  Significant for below-knee amputation on the left side which is healed. The patient is neurovascularly intact.     IMPRESSION:  Left dis

## 2018-08-29 NOTE — ANESTHESIA PREPROCEDURE EVALUATION
PRE-OP EVALUATION    Patient Name: Dilma Covarrubias    Pre-op Diagnosis: Hip fracture ([address] Jose Vitale    Procedure(s):  LEFT BIPLOAR HIP CEMENTED    Surgeon(s) and Role:     Paul Colvin MD - Primary    Pre-op vitals reviewed.   Temp: 98.4 °F (36.9 Take 325 mg by mouth daily. Disp:  Rfl:    Citalopram Hydrobromide 10 MG Oral Tab Take 10 mg by mouth daily. Disp:  Rfl:    AmLODIPine Besylate 10 MG Oral Tab Take 1 tablet (10 mg total) by mouth daily.  Disp: 90 tablet Rfl: 3   lisinopril 40 MG Oral Tab Ta Available pre-op labs reviewed.     Lab Results  Component Value Date   WBC 8.5 08/29/2018   RBC 3.75 (L) 08/29/2018   HGB 11.5 (L) 08/29/2018   HCT 34.8 08/29/2018   MCV 92.8 08/29/2018   MCH 30.7 08/29/2018   MCHC 33.0 08/29/2018   RDW 13.1 08/29/2018

## 2018-08-29 NOTE — BRIEF OP NOTE
Pre-Operative Diagnosis: Hip fracture ([facility] [address] [S72.009A]     Post-Operative Diagnosis: Hip fracture ([facility] [address] [S72.009A]      Procedure Performed:   Procedure(s):  LEFT BIPLOAR HIP CEMENTED    Surgeon(s) and Role:     Charlotte Whitlock MD - Primary    Assistant(

## 2018-08-29 NOTE — PROGRESS NOTES
08/29/18 1735   Clinical Encounter Type   Visited With Patient   Routine Visit Introduction   Continue Visiting No   Patient's Supportive Strategies/Resources  provided emotional support, including active listening and acknowledgement of concern

## 2018-08-29 NOTE — OPERATIVE REPORT
[facility]    PATIENT'S NAME: Marcel Sheila   ATTENDING PHYSICIAN: Sukhjinder Gaitan M.D. OPERATING PHYSICIAN: Marivel Gregory M.D.    PATIENT ACCOUNT#:   [de-identified]    LOCATION:  PACU [facility] PACU 11 EDWP 10  MEDICAL RECORD #:   [id number]       DATE OF fibers of the gluteus del were teased apart with a gloved digit. A self-retaining Charnley retractor was positioned with small blades.   The hip was placed in internal rotation, and the short external rotators were released from the posterior aspect of and internal rotation. Limb length was hard to assess because of the patient's BKA. The trials were removed. The bone was prepared with pulsatile lavage and a brush. A cement restrictor was placed.   Biomet cement was chosen and mixed on the back tabl Dictated By Thomas Ahumada M.D.  d: 08/29/2018 14:31:34  t: 08/29/2018 15:19:59  Job [id number]  EW/    cc: Thomas Ahumada M.D.

## 2018-08-29 NOTE — ADDENDUM NOTE
Addendum  created 08/29/18 1546 by Iram Finnegan MD    Actions taken from a BestPractice Advisory, Order list changed, Order sets accessed

## 2018-08-29 NOTE — PROGRESS NOTES
[facility]  Progress note     Elsie Eng Patient Status:  Emergency    1932 MRN [id number]   Location [facility] [facility] Attending Barb Espino MD   Hosp Day # 1 PCP Opal Smith MD     Chief Complaint: fall ordered  3. Glaucoma/macular degeneration-resuem home meds  4. Essential benign HTN-resuem home meds except hold lisionopril  5. HL-resume statin  6.  PVD s/p left BKA-hold asa for surgery - resume after when cleared by surgery    Quality:  · DVT Prophylaxi

## 2018-08-30 PROBLEM — Z47.89 ORTHOPEDIC AFTERCARE: Status: ACTIVE | Noted: 2018-08-30

## 2018-08-30 LAB
ANION GAP SERPL CALC-SCNC: 8 MMOL/L (ref 0–18)
BUN BLD-MCNC: 25 MG/DL (ref 8–20)
BUN/CREAT SERPL: 26.6 (ref 10–20)
CALCIUM BLD-MCNC: 7.9 MG/DL (ref 8.3–10.3)
CHLORIDE SERPL-SCNC: 112 MMOL/L (ref 101–111)
CO2 SERPL-SCNC: 23 MMOL/L (ref 22–32)
CREAT BLD-MCNC: 0.94 MG/DL (ref 0.55–1.02)
ERYTHROCYTE [DISTWIDTH] IN BLOOD BY AUTOMATED COUNT: 13.2 % (ref 11.5–16)
GLUCOSE BLD-MCNC: 209 MG/DL (ref 70–99)
HCT VFR BLD AUTO: 29 % (ref 34–50)
HGB BLD-MCNC: 9.4 G/DL (ref 12–16)
MCH RBC QN AUTO: 30.8 PG (ref 27–33.2)
MCHC RBC AUTO-ENTMCNC: 32.4 G/DL (ref 31–37)
MCV RBC AUTO: 95.1 FL (ref 81–100)
OSMOLALITY SERPL CALC.SUM OF ELEC: 307 MOSM/KG (ref 275–295)
PLATELET # BLD AUTO: 203 10(3)UL (ref 150–450)
POTASSIUM SERPL-SCNC: 3.9 MMOL/L (ref 3.6–5.1)
RBC # BLD AUTO: 3.05 X10(6)UL (ref 3.8–5.1)
RED CELL DISTRIBUTION WIDTH-SD: 46.3 FL (ref 35.1–46.3)
SODIUM SERPL-SCNC: 143 MMOL/L (ref 136–144)
WBC # BLD AUTO: 9.3 X10(3) UL (ref 4–13)

## 2018-08-30 PROCEDURE — 99232 SBSQ HOSP IP/OBS MODERATE 35: CPT | Performed by: HOSPITALIST

## 2018-08-30 RX ORDER — HYDROCODONE BITARTRATE AND ACETAMINOPHEN 5; 325 MG/1; MG/1
1 TABLET ORAL EVERY 4 HOURS PRN
Status: DISCONTINUED | OUTPATIENT
Start: 2018-08-30 | End: 2018-08-31

## 2018-08-30 NOTE — PROGRESS NOTES
[facility]  Progress note     Kaila Inman Patient Status:  Emergency    1932 MRN [id number]   Location 656 [facility] Attending Stewart Ramos MD   Hosp Day # 2 PCP Prabha Adams MD     Chief Complaint: fall TROP, CK in the last 168 hours. Imaging: Imaging data reviewed in Epic. ASSESSMENT / PLAN:     1. Left hip fracture after mechanical fall-pod#1 s/p hip replacement  2. UTI-ancef ordered-monitor cultures  3.  Glaucoma/macular degeneration-resue home

## 2018-08-30 NOTE — CM/SW NOTE
08/30/18 1300   CM/SW Referral Data   Referral Source Physician   Reason for Referral Discharge planning   Informant Patient   Pertinent Medical Hx   Primary Care Physician Name Karuna Oneill   Patient Info   Patient's Mental Status Alert;Oriented   Debarah Barrier

## 2018-08-30 NOTE — CM/SW NOTE
Per ERENDIRA Acveedo Peers, pt and family interersted in [facility] after discharge. PT is recommending subacute. Left message for Rebeka Booker with [facility] to review pt for their subacute unit. CM/SW to follow.      Leola Clements RN, [facility]  Spectra [phone]

## 2018-08-30 NOTE — PLAN OF CARE
Patient/Family Goals    • Patient/Family Short Term Goal Completed          PAIN - ADULT    • Verbalizes/displays adequate comfort level or patient's stated pain goal Progressing        RISK FOR INFECTION - ADULT    • Absence of fever/infection during anti

## 2018-08-30 NOTE — PROGRESS NOTES
Post Op Day 1 Ortho Note    Status Post Nerve Block:  Type of Nerve Block: Left Fascia Iliaca  Single Injection Nerve Block    Post op review: no evidence of nerve block complications.  Patient has LLE BKA  Patient denies pain at rest - minimal pain with ac

## 2018-08-30 NOTE — OCCUPATIONAL THERAPY NOTE
OCCUPATIONAL THERAPY EVALUATION - INPATIENT     Room Number: 376/376-A  Evaluation Date: 8/30/2018  Type of Evaluation: Initial  Presenting Problem: s/p L cemented bipolar hip replacement 8/29/18    Physician Order: IP Consult to Occupational Therapy  Reas None    Occupation/Status: Retired     Drives: No  Patient Regularly Uses: None (Per patient glasses does not help with her macular degenrati)    Prior Level of Function: Patient reports independent in most BADLs and IADLs (including medication management includes using toilet, bedpan or urinal? : A Lot (simulated)  -   Putting on and taking off regular upper body clothing?: A Little  -   Taking care of personal grooming such as brushing teeth?: A Little  -   Eating meals?: A Little    AM-PAC Score:  Score: pain, decreased balance, decreased endurance, decreased knowledge of hip precautions and incorporation into ADLs, decreased knowledge of adaptive techniques.  These deficits impact the patient’s ability to participate in lower body dressing/bathing, toileti education  Rehab Potential : Good  Frequency (Obs): 5x/week  Number of Visits to Meet Established Goals: 5    ADL GOALS   Patient will perform Lower Body Dressing with supervision  Patient will perform Toileting with supervision    FUNCTIONAL TRANSFER GOAL

## 2018-08-30 NOTE — PHYSICAL THERAPY NOTE
PHYSICAL THERAPY EVALUATION - INPATIENT     Room Number: 376/376-A  Evaluation Date: 8/30/2018  Type of Evaluation: Initial  Physician Order: PT Eval and Treat    Presenting Problem: S/p Left Bipolar Cemented hip Replacement on 08/29/18  Reason for T prosthesis independently. Son does most of house chores & meals prep. SUBJECTIVE  \" My old prosthesis was fitting better than new one. \" Patient was participatory & motivated. Son was present initially though left after short time.     Patient self-stat person does the patient currently need. ..   -   Moving to and from a bed to a chair (including a wheelchair)?: A Little   -   Need to walk in hospital room?: A Little   -   Climbing 3-5 steps with a railing?: A Lot       AM-PAC Score:  Raw Score: 17   PT A hip Replacement on 08/29/18. Pertinent comorbidities and personal factors impacting therapy include PVD,Left BKA,HTN, Anxiety.   In this PT evaluation, the patient presents with the following impairments decreased ROM & strength in left hip,minimal awarene Patient to be able to recall 3/3 posterior hip precautions independently   Goal #5    Goal #6    Goal Comments: Goals established on 8/30/2018

## 2018-08-31 VITALS
HEART RATE: 57 BPM | TEMPERATURE: 98 F | DIASTOLIC BLOOD PRESSURE: 40 MMHG | RESPIRATION RATE: 18 BRPM | OXYGEN SATURATION: 98 % | WEIGHT: 132.25 LBS | BODY MASS INDEX: 25 KG/M2 | SYSTOLIC BLOOD PRESSURE: 132 MMHG

## 2018-08-31 LAB
ERYTHROCYTE [DISTWIDTH] IN BLOOD BY AUTOMATED COUNT: 13.4 % (ref 11.5–16)
HCT VFR BLD AUTO: 26.8 % (ref 34–50)
HGB BLD-MCNC: 8.9 G/DL (ref 12–16)
MCH RBC QN AUTO: 31.6 PG (ref 27–33.2)
MCHC RBC AUTO-ENTMCNC: 33.2 G/DL (ref 31–37)
MCV RBC AUTO: 95 FL (ref 81–100)
PLATELET # BLD AUTO: 191 10(3)UL (ref 150–450)
RBC # BLD AUTO: 2.82 X10(6)UL (ref 3.8–5.1)
RED CELL DISTRIBUTION WIDTH-SD: 46.5 FL (ref 35.1–46.3)
WBC # BLD AUTO: 9.2 X10(3) UL (ref 4–13)

## 2018-08-31 PROCEDURE — 99239 HOSP IP/OBS DSCHRG MGMT >30: CPT | Performed by: HOSPITALIST

## 2018-08-31 RX ORDER — ASPIRIN 81 MG/1
81 TABLET ORAL DAILY
Qty: 30 TABLET | Refills: 2 | Status: SHIPPED | OUTPATIENT
Start: 2018-08-31

## 2018-08-31 RX ORDER — CEPHALEXIN 500 MG/1
500 CAPSULE ORAL 3 TIMES DAILY
Qty: 21 CAPSULE | Refills: 0 | Status: SHIPPED | OUTPATIENT
Start: 2018-08-31 | End: 2018-09-07

## 2018-08-31 RX ORDER — LISINOPRIL 40 MG/1
40 TABLET ORAL DAILY
Qty: 90 TABLET | Refills: 3 | Status: SHIPPED | OUTPATIENT
Start: 2018-08-31 | End: 2019-07-30

## 2018-08-31 RX ORDER — AMLODIPINE BESYLATE 10 MG/1
10 TABLET ORAL DAILY
Qty: 90 TABLET | Refills: 3 | Status: SHIPPED | OUTPATIENT
Start: 2018-08-31 | End: 2019-07-30

## 2018-08-31 RX ORDER — HYDROCODONE BITARTRATE AND ACETAMINOPHEN 5; 325 MG/1; MG/1
1-2 TABLET ORAL EVERY 4 HOURS PRN
Qty: 20 TABLET | Refills: 0 | Status: SHIPPED | OUTPATIENT
Start: 2018-08-31 | End: 2018-12-06

## 2018-08-31 NOTE — PHYSICAL THERAPY NOTE
PHYSICAL THERAPY TREATMENT NOTE - INPATIENT    Room Number: 376/376-A     Session: 1   Number of Visits to Meet Established Goals: 5    Presenting Problem: S/p Left Bipolar Cemented hip Replacement on 08/29/18  History related to current admission: Rocael mechanics;Breathing techniques;Relaxation;Repositioning    BALANCE                                                                                                                     Static Sitting: Good  Dynamic Sitting: Fair           Static Standing: Po ft with 1 seated rest in chair. Gait balance is fair to poor @ times. Patient participated with left LE exercises as per PATY rehab protocol.     THERAPEUTIC EXERCISES  Lower Extremity Ankle pumps  Glut sets  Heel slides  LAQ  Quad sets  SAQ     Upper Extremi to/from Chair/Wheelchair at assistance level: supervision      Goal #3 Patient is able to ambulate 150 feet with assist device: walker - rolling at assistance level: supervision      Goal #4 Patient to be able to recall 3/3 posterior hip precautions indepe

## 2018-08-31 NOTE — CM/SW NOTE
Informed by María 56 with [facility] that rm [phone] is available for pt today with a 2PM d/c time. rn report # [phone] with RN- will need [employer]. Brian Bain accepted transport as above. Pt updated re: est cost of [employer] and she agrees.   Cari

## 2018-08-31 NOTE — PROGRESS NOTES
Acute Pain Service    Post Op Day 2 Ortho Note    Assessed patient in chair. Patient states [address] is working well to manage pain; denies itching/nausea/dizziness. Patient able to bear weight on sx leg; equal sensation in BLE. No further recommendations.

## 2018-08-31 NOTE — PLAN OF CARE
Called report to RN at [facility] at 1400. All patient belongings packed and sent with patient at time of transfer. Scripts for pain meds, antibiotics, and blood thinners sent with patient. She was picked up by Ruby Minor at 3512 for transport.

## 2018-08-31 NOTE — CM/SW NOTE
08/31/18 1500   Discharge disposition   Expected discharge disposition Skilled Nurs   Name of Facillity/Home Care/Hospice [facility]   Patient is Discharged to a [address] [place] [address] Yes   Discharge transportation [facility]

## 2018-09-01 NOTE — DISCHARGE SUMMARY
[facility]  DISCHARGE SUMMARY     Duong Belle Patient Status:  Inpatient    1932 MRN [id number]   [facility] 3SW-A Attending No att. providers found   Hosp Day # 3 PCP Kesha Remy MD     Date of Admission: 2018  Da Meropenem <=0.25 \"><=0.25  Sensitive    Nitrofurantoin <=16 \"><=16  Sensitive    Piperacillin + Tazobactam <=4 \"><=4  Sensitive    Trimethoprim/Sulfa <=20 \"><=20  Sensitive                Procedures during hospitalization:   ? See above    Incidental o Place 1 drop into both eyes 2 (two) times daily. Refills:  0     latanoprost 0.005 % Soln  Commonly known as:  XALATAN      Place 1 drop into both eyes nightly.    Refills:  0     lisinopril 40 MG Tabs  Commonly known as:  PRINIVIL,ZESTRIL      Take 1 ta Musculoskeletal: Moves all extremities but moves left hip with pain  Extremities: left BKA  Integument: No rashes or lesions.   -----------------------------------------------------------------------------------------------  PATIENT DISCHARGE INSTRUCTIONS:

## 2018-09-14 ENCOUNTER — TELEPHONE (OUTPATIENT)
Dept: INTERNAL MEDICINE CLINIC | Facility: CLINIC | Age: 83
End: 2018-09-14

## 2018-09-14 NOTE — TELEPHONE ENCOUNTER
Start of care will have to be pushed back cathy from [facility] can not get in touch with pt

## 2018-09-17 RX ORDER — CITALOPRAM 10 MG/1
TABLET ORAL
Qty: 90 TABLET | Refills: 1 | Status: SHIPPED | OUTPATIENT
Start: 2018-09-17 | End: 2018-12-06

## 2018-09-17 NOTE — TELEPHONE ENCOUNTER
Refill requested:   Requested Prescriptions     Pending Prescriptions Disp Refills   • CITALOPRAM HYDROBROMIDE 10 MG Oral Tab [Pharmacy Med Name: CITALOPRAM HBR 10 MG TABLET] 90 tablet 1     Sig: TAKE 1 TABLET (10 MG TOTAL) BY MOUTH DAILY.        Failed pro

## 2018-10-03 ENCOUNTER — TELEPHONE (OUTPATIENT)
Dept: INTERNAL MEDICINE CLINIC | Facility: CLINIC | Age: 83
End: 2018-10-03

## 2018-10-03 NOTE — TELEPHONE ENCOUNTER
[facility] calling in, Seeking verbal approval STAT, to discharge patient from [address] [place] [address], for goals being met.     Person to contact: Cammie Matamoros  T:[phone]

## 2018-11-07 NOTE — TELEPHONE ENCOUNTER
Leandro from [place] [address] is calling to have the orders re faxed to [phone] from September. Leandro's call back number is [phone]. Please advise.

## 2018-12-06 ENCOUNTER — LAB ENCOUNTER (OUTPATIENT)
Dept: LAB | Age: 83
End: 2018-12-06
Attending: INTERNAL MEDICINE
Payer: MEDICARE

## 2018-12-06 ENCOUNTER — OFFICE VISIT (OUTPATIENT)
Dept: INTERNAL MEDICINE CLINIC | Facility: CLINIC | Age: 83
End: 2018-12-06
Payer: MEDICARE

## 2018-12-06 VITALS
RESPIRATION RATE: 18 BRPM | TEMPERATURE: 98 F | BODY MASS INDEX: 25.3 KG/M2 | SYSTOLIC BLOOD PRESSURE: 130 MMHG | WEIGHT: 134 LBS | DIASTOLIC BLOOD PRESSURE: 50 MMHG | HEIGHT: 61 IN | HEART RATE: 65 BPM | OXYGEN SATURATION: 98 %

## 2018-12-06 DIAGNOSIS — I77.9 PERIPHERAL ARTERIAL OCCLUSIVE DISEASE (HCC): ICD-10-CM

## 2018-12-06 DIAGNOSIS — D62 ACUTE BLOOD LOSS ANEMIA: ICD-10-CM

## 2018-12-06 DIAGNOSIS — E78.5 HYPERLIPIDEMIA WITH TARGET LDL LESS THAN 70: ICD-10-CM

## 2018-12-06 DIAGNOSIS — I73.9 PVD (PERIPHERAL VASCULAR DISEASE) (HCC): ICD-10-CM

## 2018-12-06 DIAGNOSIS — I10 ESSENTIAL HYPERTENSION: ICD-10-CM

## 2018-12-06 DIAGNOSIS — F17.200 TOBACCO DEPENDENCE: ICD-10-CM

## 2018-12-06 DIAGNOSIS — Z00.00 ROUTINE GENERAL MEDICAL EXAMINATION AT A HEALTH CARE FACILITY: ICD-10-CM

## 2018-12-06 DIAGNOSIS — M89.9 DISORDER OF BONE AND CARTILAGE: ICD-10-CM

## 2018-12-06 DIAGNOSIS — Z00.00 ROUTINE GENERAL MEDICAL EXAMINATION AT A HEALTH CARE FACILITY: Primary | ICD-10-CM

## 2018-12-06 DIAGNOSIS — M94.9 DISORDER OF BONE AND CARTILAGE: ICD-10-CM

## 2018-12-06 DIAGNOSIS — Z89.512 STATUS POST BELOW KNEE AMPUTATION OF LEFT LOWER EXTREMITY: ICD-10-CM

## 2018-12-06 DIAGNOSIS — F41.1 GENERALIZED ANXIETY DISORDER: ICD-10-CM

## 2018-12-06 PROCEDURE — G0009 ADMIN PNEUMOCOCCAL VACCINE: HCPCS | Performed by: INTERNAL MEDICINE

## 2018-12-06 PROCEDURE — G0444 DEPRESSION SCREEN ANNUAL: HCPCS | Performed by: INTERNAL MEDICINE

## 2018-12-06 PROCEDURE — 99214 OFFICE O/P EST MOD 30 MIN: CPT | Performed by: INTERNAL MEDICINE

## 2018-12-06 PROCEDURE — 84450 TRANSFERASE (AST) (SGOT): CPT

## 2018-12-06 PROCEDURE — 85025 COMPLETE CBC W/AUTO DIFF WBC: CPT

## 2018-12-06 PROCEDURE — 80061 LIPID PANEL: CPT

## 2018-12-06 PROCEDURE — 84460 ALANINE AMINO (ALT) (SGPT): CPT

## 2018-12-06 PROCEDURE — 80048 BASIC METABOLIC PNL TOTAL CA: CPT

## 2018-12-06 PROCEDURE — 83036 HEMOGLOBIN GLYCOSYLATED A1C: CPT

## 2018-12-06 PROCEDURE — 36415 COLL VENOUS BLD VENIPUNCTURE: CPT

## 2018-12-06 PROCEDURE — G0439 PPPS, SUBSEQ VISIT: HCPCS | Performed by: INTERNAL MEDICINE

## 2018-12-06 PROCEDURE — 90653 IIV ADJUVANT VACCINE IM: CPT | Performed by: INTERNAL MEDICINE

## 2018-12-06 PROCEDURE — 90732 PPSV23 VACC 2 YRS+ SUBQ/IM: CPT | Performed by: INTERNAL MEDICINE

## 2018-12-06 PROCEDURE — G0008 ADMIN INFLUENZA VIRUS VAC: HCPCS | Performed by: INTERNAL MEDICINE

## 2018-12-06 RX ORDER — METOPROLOL TARTRATE 50 MG/1
75 TABLET, FILM COATED ORAL 2 TIMES DAILY
Qty: 135 TABLET | Refills: 3 | Status: SHIPPED | OUTPATIENT
Start: 2018-12-06 | End: 2019-07-30

## 2018-12-06 RX ORDER — APIXABAN 2.5 MG/1
TABLET, FILM COATED ORAL
COMMUNITY
Start: 2018-09-14 | End: 2018-12-06

## 2018-12-06 RX ORDER — ATORVASTATIN CALCIUM 40 MG/1
40 TABLET, FILM COATED ORAL NIGHTLY
Qty: 90 TABLET | Refills: 3 | Status: SHIPPED | OUTPATIENT
Start: 2018-12-06 | End: 2019-07-30

## 2018-12-06 RX ORDER — CITALOPRAM 10 MG/1
10 TABLET ORAL DAILY
Qty: 90 TABLET | Refills: 3 | Status: SHIPPED | OUTPATIENT
Start: 2018-12-06 | End: 2020-01-05

## 2018-12-06 NOTE — PROGRESS NOTES
Kia Salinas is a 80year old female. HPI:   Patient presents for medicare wellness exam and additional issues noted below. 1. Left hip fracture and s/p repair on 8/28/2018. No longer having pain. She completed outpatient PT.  She is using walker • Ovarian cancer ([facility] 1962    R ovary removed, cannot remember for sure.  No xrt/radiation   • Peripheral vascular disease (HCC)    • PVD (peripheral vascular disease) ([facility]    • Unspecified essential hypertension    • Visual impairment     macular dege metoprolol, . # B/L Carotid Artery Disease: Cont w/med management including statin, ASA. No h/o CVA.    # Tobacco Dependence: she has been counseled repeatedly but remains ambivalent about cessation. # LLE Neuropathy: since amputation.  chronic,

## 2018-12-06 NOTE — PATIENT INSTRUCTIONS
You need 3 eight ounce servings of calcium/vitamin D daily. This includes spinach, kale, broccoli, almonds, milk, yogurt, or cottage cheese.      I do advise you get the TDAP (tetanus, diptheriae, pertussis) vaccine every 10 years but it can cost up to $65.

## 2019-07-29 ENCOUNTER — TELEPHONE (OUTPATIENT)
Dept: INTERNAL MEDICINE CLINIC | Facility: CLINIC | Age: 84
End: 2019-07-29

## 2019-07-29 NOTE — TELEPHONE ENCOUNTER
Daughter is not on pts HIPAA forms. Spoke with pts son Jak Blanco since he is listed on pts HIPAA forms. Son is inquiring why pt is on 3 different BP medications-amlodipine, lisinopril and metoprolol.      All of the medications Lesley is inquiring about are RX by

## 2019-07-29 NOTE — TELEPHONE ENCOUNTER
Patient's Daughter, Pb Cotews, calling in and would like to discuss the blood pressure medications her Mother Jessi Cowart is taking.

## 2020-01-03 NOTE — TELEPHONE ENCOUNTER
CITALOPRAM HYDROBROMIDE 10 MG Oral Tab    Last OV relevant to medication: 12-6-2018    Last refill date: 12-6-2018 # 90 tabs with 3 refills    When pt was asked to return for OV: 1 year     Upcoming appt/reason: none     Labs: 12-6-2018

## 2020-01-05 RX ORDER — CITALOPRAM 10 MG/1
TABLET ORAL
Qty: 90 TABLET | Refills: 0 | Status: SHIPPED | OUTPATIENT
Start: 2020-01-05 | End: 2020-03-19

## 2020-01-05 NOTE — TELEPHONE ENCOUNTER
Last refill.  She is due for medicare wellness exam (block appt for 45 minutes) and labs (ordered in 7/2019)

## 2020-01-09 NOTE — TELEPHONE ENCOUNTER
Your Appointments    2020 11:00 AM CDT  Medicare Annual Well Visit with Neal Holden MD  [address] [facility] ANTONIOLYLE BASHIR ([facility]) [address] [facility] [address]  [facility] [phone]

## 2020-03-19 RX ORDER — CITALOPRAM 10 MG/1
TABLET ORAL
Qty: 90 TABLET | Refills: 0 | Status: SHIPPED | OUTPATIENT
Start: 2020-03-19 | End: 2020-10-20

## 2020-03-19 NOTE — TELEPHONE ENCOUNTER
CITALOPRAM HYDROBROMIDE 10 MG     Last OV relevant to medication:      Last refill date: 1-5-2020 #90 tabs with 0 refills      When pt was asked to return for OV:     Upcoming appt/reason: 5-6-2020    Labs: 12-6-2018-lipid

## 2020-08-04 ENCOUNTER — OFFICE VISIT (OUTPATIENT)
Dept: INTERNAL MEDICINE CLINIC | Facility: CLINIC | Age: 85
End: 2020-08-04
Payer: MEDICARE

## 2020-08-04 VITALS
BODY MASS INDEX: 24.99 KG/M2 | RESPIRATION RATE: 16 BRPM | TEMPERATURE: 99 F | DIASTOLIC BLOOD PRESSURE: 60 MMHG | HEART RATE: 75 BPM | HEIGHT: 61 IN | OXYGEN SATURATION: 98 % | WEIGHT: 132.38 LBS | SYSTOLIC BLOOD PRESSURE: 150 MMHG

## 2020-08-04 DIAGNOSIS — E78.5 HYPERLIPIDEMIA WITH TARGET LDL LESS THAN 70: ICD-10-CM

## 2020-08-04 DIAGNOSIS — Z78.0 POSTMENOPAUSAL: ICD-10-CM

## 2020-08-04 DIAGNOSIS — Z00.00 ROUTINE GENERAL MEDICAL EXAMINATION AT A HEALTH CARE FACILITY: Primary | ICD-10-CM

## 2020-08-04 DIAGNOSIS — I10 ESSENTIAL HYPERTENSION: ICD-10-CM

## 2020-08-04 DIAGNOSIS — S72.002D CLOSED FRACTURE OF LEFT HIP WITH ROUTINE HEALING, SUBSEQUENT ENCOUNTER: ICD-10-CM

## 2020-08-04 DIAGNOSIS — F41.1 GENERALIZED ANXIETY DISORDER: ICD-10-CM

## 2020-08-04 DIAGNOSIS — F17.200 TOBACCO DEPENDENCE: ICD-10-CM

## 2020-08-04 DIAGNOSIS — R73.01 IMPAIRED FASTING GLUCOSE: ICD-10-CM

## 2020-08-04 DIAGNOSIS — I77.9 PERIPHERAL ARTERIAL OCCLUSIVE DISEASE (HCC): ICD-10-CM

## 2020-08-04 DIAGNOSIS — Z13.31 SCREENING FOR DEPRESSION: ICD-10-CM

## 2020-08-04 PROCEDURE — 99214 OFFICE O/P EST MOD 30 MIN: CPT | Performed by: INTERNAL MEDICINE

## 2020-08-04 PROCEDURE — G0439 PPPS, SUBSEQ VISIT: HCPCS | Performed by: INTERNAL MEDICINE

## 2020-08-04 PROCEDURE — G0444 DEPRESSION SCREEN ANNUAL: HCPCS | Performed by: INTERNAL MEDICINE

## 2020-08-04 NOTE — PROGRESS NOTES
Morenita Tapia is a 80year old female. HPI:   Patient presents for medicare wellness exam and additional issues noted below. She is overdue for labs. 1. Weight loss: lives with her son but he does not cook.  She cannot cook because her vision is kg)  05/14/18 : 135 lb (61.2 kg)        Shellfish               DIARRHEA, NAUSEA AND VOMITING    Family History   Problem Relation Age of Onset   • Other (TB) Father    • Cancer Paternal Grandmother         colon cancer   • Cancer Daughter       Past Medic CN 2-12 grossly intact  PSYCH: pleasant  EXTREMITIES: no cyanosis, clubbing, edema L below knee ampuation, wearing prosthesis    ASSESSMENT AND PLAN:   # Weight loss: I reviewed her weight since 2014. Her weight has been between 122-134.  The weight of 147 prevnar 13 and pneumovac 23. Educated on TDAP and shingrix. Gets annual flu shot. Healthcare Living Will, Medical POA:  Full code but no prolonged life support. Lives with son but would want her daughter, Naomie Tavarez would be POA. Forms provided.      C

## 2020-08-04 NOTE — PATIENT INSTRUCTIONS
Please call central scheduling at [phone] to schedule your labs and bone scan (DEXA) as soon as possible. For your depression and anxiety, you can take the citalopram 10 mg every other day for 4 weeks and then stop it.  If you become anxious or sa

## 2021-02-05 ENCOUNTER — OFFICE VISIT (OUTPATIENT)
Dept: INTERNAL MEDICINE CLINIC | Facility: CLINIC | Age: 86
End: 2021-02-05
Payer: MEDICARE

## 2021-02-05 ENCOUNTER — LAB ENCOUNTER (OUTPATIENT)
Dept: LAB | Age: 86
End: 2021-02-05
Attending: INTERNAL MEDICINE
Payer: MEDICARE

## 2021-02-05 VITALS
BODY MASS INDEX: 28.32 KG/M2 | DIASTOLIC BLOOD PRESSURE: 72 MMHG | SYSTOLIC BLOOD PRESSURE: 132 MMHG | HEIGHT: 61 IN | RESPIRATION RATE: 18 BRPM | HEART RATE: 72 BPM | TEMPERATURE: 98 F | WEIGHT: 150 LBS | OXYGEN SATURATION: 98 %

## 2021-02-05 DIAGNOSIS — I77.9 PERIPHERAL ARTERIAL OCCLUSIVE DISEASE (HCC): ICD-10-CM

## 2021-02-05 DIAGNOSIS — I10 ESSENTIAL HYPERTENSION: ICD-10-CM

## 2021-02-05 DIAGNOSIS — F17.200 TOBACCO DEPENDENCE: ICD-10-CM

## 2021-02-05 DIAGNOSIS — F41.1 GENERALIZED ANXIETY DISORDER: ICD-10-CM

## 2021-02-05 DIAGNOSIS — R73.01 IMPAIRED FASTING GLUCOSE: ICD-10-CM

## 2021-02-05 DIAGNOSIS — Z89.512 STATUS POST BELOW-KNEE AMPUTATION OF LEFT LOWER EXTREMITY (HCC): ICD-10-CM

## 2021-02-05 DIAGNOSIS — Z00.00 ROUTINE GENERAL MEDICAL EXAMINATION AT A HEALTH CARE FACILITY: ICD-10-CM

## 2021-02-05 DIAGNOSIS — E78.5 HYPERLIPIDEMIA WITH TARGET LDL LESS THAN 70: Primary | ICD-10-CM

## 2021-02-05 DIAGNOSIS — Z13.31 SCREENING FOR DEPRESSION: ICD-10-CM

## 2021-02-05 DIAGNOSIS — E66.3 OVERWEIGHT (BMI 25.0-29.9): ICD-10-CM

## 2021-02-05 DIAGNOSIS — E78.5 HYPERLIPIDEMIA WITH TARGET LDL LESS THAN 70: ICD-10-CM

## 2021-02-05 LAB
ALT SERPL-CCNC: 23 U/L
ANION GAP SERPL CALC-SCNC: 9 MMOL/L (ref 0–18)
AST SERPL-CCNC: 23 U/L (ref 15–37)
BUN BLD-MCNC: 16 MG/DL (ref 7–18)
BUN/CREAT SERPL: 17.4 (ref 10–20)
CALCIUM BLD-MCNC: 8.6 MG/DL (ref 8.5–10.1)
CHLORIDE SERPL-SCNC: 115 MMOL/L (ref 98–112)
CHOLEST SMN-MCNC: 127 MG/DL (ref ?–200)
CO2 SERPL-SCNC: 20 MMOL/L (ref 21–32)
CREAT BLD-MCNC: 0.92 MG/DL
GLUCOSE BLD-MCNC: 129 MG/DL (ref 70–99)
HCV AB SERPL QL IA: NONREACTIVE
HDLC SERPL-MCNC: 43 MG/DL (ref 40–59)
LDLC SERPL CALC-MCNC: 58 MG/DL (ref ?–100)
NONHDLC SERPL-MCNC: 84 MG/DL (ref ?–130)
OSMOLALITY SERPL CALC.SUM OF ELEC: 301 MOSM/KG (ref 275–295)
POTASSIUM SERPL-SCNC: 3.7 MMOL/L (ref 3.5–5.1)
SODIUM SERPL-SCNC: 144 MMOL/L (ref 136–145)
TRIGL SERPL-MCNC: 131 MG/DL (ref 30–149)
VLDLC SERPL CALC-MCNC: 26 MG/DL (ref 0–30)

## 2021-02-05 PROCEDURE — 84450 TRANSFERASE (AST) (SGOT): CPT

## 2021-02-05 PROCEDURE — 84460 ALANINE AMINO (ALT) (SGPT): CPT

## 2021-02-05 PROCEDURE — 36415 COLL VENOUS BLD VENIPUNCTURE: CPT

## 2021-02-05 PROCEDURE — 80061 LIPID PANEL: CPT

## 2021-02-05 PROCEDURE — 80048 BASIC METABOLIC PNL TOTAL CA: CPT

## 2021-02-05 PROCEDURE — 86803 HEPATITIS C AB TEST: CPT

## 2021-02-05 PROCEDURE — 99214 OFFICE O/P EST MOD 30 MIN: CPT | Performed by: INTERNAL MEDICINE

## 2021-02-05 NOTE — PROGRESS NOTES
Winifred Westbrook is a 80year old female. HPI:   Patient presents for the following issues. 1. Weight loss: she has gained weight back. Eating 3 times per day. Not using any supplemetns.   2. History of fracture: has not completed DEXA but willing to Performed by Kuldip Anand MD at [facility] MAIN OR   • [address]   • OTHER SURGICAL HISTORY Left     BKA for PVDz   • OTHER SURGICAL HISTORY Left 09/2018    hip repair      Social History:    Social History    Tobacco Use      Smoking status: Valerie Main Glaucoma/Macular Degeneration: cont eye gtt and preservision tablets.    # Generalized Anxiety Disorder: doing well off citalopram  # Health Maintenance: medicare wellness exam on 8/4/2020  Colon Cancer Screening: declines colonoscopy  Breast Cancer Screeni

## 2021-02-05 NOTE — PATIENT INSTRUCTIONS
Please complete your x-ray for your bones as soon as possible. Please complete your labs TODAY. You are due for your medicare wellness exam in August, 2021.

## 2021-03-05 DIAGNOSIS — Z23 NEED FOR VACCINATION: ICD-10-CM

## 2021-09-15 ENCOUNTER — OFFICE VISIT (OUTPATIENT)
Dept: INTERNAL MEDICINE CLINIC | Facility: CLINIC | Age: 86
End: 2021-09-15
Payer: MEDICARE

## 2021-09-15 ENCOUNTER — LAB ENCOUNTER (OUTPATIENT)
Dept: LAB | Age: 86
End: 2021-09-15
Attending: INTERNAL MEDICINE
Payer: MEDICARE

## 2021-09-15 VITALS
RESPIRATION RATE: 14 BRPM | DIASTOLIC BLOOD PRESSURE: 60 MMHG | TEMPERATURE: 98 F | HEIGHT: 61 IN | OXYGEN SATURATION: 97 % | BODY MASS INDEX: 27.38 KG/M2 | SYSTOLIC BLOOD PRESSURE: 120 MMHG | WEIGHT: 145 LBS | HEART RATE: 70 BPM

## 2021-09-15 DIAGNOSIS — S72.002D CLOSED FRACTURE OF LEFT HIP WITH ROUTINE HEALING, SUBSEQUENT ENCOUNTER: ICD-10-CM

## 2021-09-15 DIAGNOSIS — I73.9 PVD (PERIPHERAL VASCULAR DISEASE) (HCC): ICD-10-CM

## 2021-09-15 DIAGNOSIS — F17.200 TOBACCO DEPENDENCE: ICD-10-CM

## 2021-09-15 DIAGNOSIS — G62.9 NEUROPATHY: ICD-10-CM

## 2021-09-15 DIAGNOSIS — E78.5 HYPERLIPIDEMIA WITH TARGET LDL LESS THAN 70: ICD-10-CM

## 2021-09-15 DIAGNOSIS — Z00.00 ROUTINE GENERAL MEDICAL EXAMINATION AT A HEALTH CARE FACILITY: Primary | ICD-10-CM

## 2021-09-15 DIAGNOSIS — I77.9 PERIPHERAL ARTERIAL OCCLUSIVE DISEASE (HCC): ICD-10-CM

## 2021-09-15 DIAGNOSIS — Z13.31 SCREENING FOR DEPRESSION: ICD-10-CM

## 2021-09-15 DIAGNOSIS — F41.9 ANXIETY AND DEPRESSION: ICD-10-CM

## 2021-09-15 DIAGNOSIS — E66.3 OVERWEIGHT (BMI 25.0-29.9): ICD-10-CM

## 2021-09-15 DIAGNOSIS — Z78.0 POSTMENOPAUSAL: ICD-10-CM

## 2021-09-15 DIAGNOSIS — I10 PRIMARY HYPERTENSION: ICD-10-CM

## 2021-09-15 DIAGNOSIS — R73.01 IMPAIRED FASTING GLUCOSE: ICD-10-CM

## 2021-09-15 DIAGNOSIS — Z72.0 TOBACCO ABUSE: ICD-10-CM

## 2021-09-15 DIAGNOSIS — Z00.00 ROUTINE GENERAL MEDICAL EXAMINATION AT A HEALTH CARE FACILITY: ICD-10-CM

## 2021-09-15 DIAGNOSIS — F32.A ANXIETY AND DEPRESSION: ICD-10-CM

## 2021-09-15 PROBLEM — F41.1 GENERALIZED ANXIETY DISORDER: Status: RESOLVED | Noted: 2017-05-01 | Resolved: 2021-09-15

## 2021-09-15 LAB
ALT SERPL-CCNC: 25 U/L
ANION GAP SERPL CALC-SCNC: 7 MMOL/L (ref 0–18)
AST SERPL-CCNC: 34 U/L (ref 15–37)
BUN BLD-MCNC: 15 MG/DL (ref 7–18)
CALCIUM BLD-MCNC: 8.8 MG/DL (ref 8.5–10.1)
CHLORIDE SERPL-SCNC: 113 MMOL/L (ref 98–112)
CO2 SERPL-SCNC: 21 MMOL/L (ref 21–32)
CREAT BLD-MCNC: 0.91 MG/DL
GLUCOSE BLD-MCNC: 141 MG/DL (ref 70–99)
OSMOLALITY SERPL CALC.SUM OF ELEC: 295 MOSM/KG (ref 275–295)
POTASSIUM SERPL-SCNC: 4.1 MMOL/L (ref 3.5–5.1)
SODIUM SERPL-SCNC: 141 MMOL/L (ref 136–145)

## 2021-09-15 PROCEDURE — G0439 PPPS, SUBSEQ VISIT: HCPCS | Performed by: INTERNAL MEDICINE

## 2021-09-15 PROCEDURE — 84460 ALANINE AMINO (ALT) (SGPT): CPT

## 2021-09-15 PROCEDURE — 99214 OFFICE O/P EST MOD 30 MIN: CPT | Performed by: INTERNAL MEDICINE

## 2021-09-15 PROCEDURE — 80048 BASIC METABOLIC PNL TOTAL CA: CPT

## 2021-09-15 PROCEDURE — 84450 TRANSFERASE (AST) (SGOT): CPT

## 2021-09-15 PROCEDURE — G0444 DEPRESSION SCREEN ANNUAL: HCPCS | Performed by: INTERNAL MEDICINE

## 2021-09-15 PROCEDURE — 36415 COLL VENOUS BLD VENIPUNCTURE: CPT

## 2021-09-15 RX ORDER — AMLODIPINE BESYLATE 10 MG/1
10 TABLET ORAL DAILY
Qty: 90 TABLET | Refills: 3 | Status: SHIPPED | OUTPATIENT
Start: 2021-09-15

## 2021-09-15 RX ORDER — ATORVASTATIN CALCIUM 40 MG/1
40 TABLET, FILM COATED ORAL NIGHTLY
Qty: 90 TABLET | Refills: 3 | Status: SHIPPED | OUTPATIENT
Start: 2021-09-15

## 2021-09-15 NOTE — PATIENT INSTRUCTIONS
Please call me with dates of your COVID vaccine. Please complete your labs today and repeat them again In March, 2022. Please start over the counter vitamin D3 2000 units and take it every day for life.        I do advise you get the TDAP (tetanus,

## 2021-09-15 NOTE — PROGRESS NOTES
Christopher Servin is a 80year old female. HPI:   Patient presents for medicare wellness exam and additional issues noted below. She is still living with her son. Denies any health concerns. 1. \"nervous person. \": denies anxiety disorder and feels ovary removed, cannot remember for sure.  No xrt/radiation   • Peripheral vascular disease (HCC)    • PVD (peripheral vascular disease) ([facility] [address]    • Unspecified essential hypertension    • Visual impairment     macular degeneration      Past Surgical History: ambivalent about cessation. # LLE Neuropathy: since amputation. chronic, able to tolerate. Off gabapentin now  # Ovarian Cancer: s/p surgery in 1962 but cannot remember which side.    # Bilateral Glaucoma/Macular Degeneration: cont eye gtt and preservisio

## 2021-09-24 ENCOUNTER — LAB ENCOUNTER (OUTPATIENT)
Dept: LAB | Age: 86
End: 2021-09-24
Attending: INTERNAL MEDICINE
Payer: MEDICARE

## 2021-09-24 DIAGNOSIS — R73.01 IMPAIRED FASTING GLUCOSE: ICD-10-CM

## 2021-09-24 LAB
EST. AVERAGE GLUCOSE BLD GHB EST-MCNC: 146 MG/DL (ref 68–126)
HBA1C MFR BLD HPLC: 6.7 % (ref ?–5.7)

## 2021-09-24 PROCEDURE — 83036 HEMOGLOBIN GLYCOSYLATED A1C: CPT

## 2021-09-24 PROCEDURE — 36415 COLL VENOUS BLD VENIPUNCTURE: CPT

## 2021-09-29 ENCOUNTER — TELEPHONE (OUTPATIENT)
Dept: INTERNAL MEDICINE CLINIC | Facility: CLINIC | Age: 86
End: 2021-09-29

## 2022-05-13 ENCOUNTER — OFFICE VISIT (OUTPATIENT)
Dept: INTERNAL MEDICINE CLINIC | Facility: CLINIC | Age: 87
End: 2022-05-13
Payer: MEDICARE

## 2022-05-13 VITALS
HEART RATE: 66 BPM | OXYGEN SATURATION: 97 % | DIASTOLIC BLOOD PRESSURE: 40 MMHG | BODY MASS INDEX: 26.17 KG/M2 | RESPIRATION RATE: 20 BRPM | WEIGHT: 138.63 LBS | HEIGHT: 61 IN | SYSTOLIC BLOOD PRESSURE: 130 MMHG | TEMPERATURE: 98 F

## 2022-05-13 DIAGNOSIS — R73.01 IMPAIRED FASTING GLUCOSE: Primary | ICD-10-CM

## 2022-05-13 DIAGNOSIS — F17.200 TOBACCO DEPENDENCE: ICD-10-CM

## 2022-05-13 DIAGNOSIS — E78.5 HYPERLIPIDEMIA WITH TARGET LDL LESS THAN 70: ICD-10-CM

## 2022-05-13 DIAGNOSIS — I73.9 PVD (PERIPHERAL VASCULAR DISEASE) (HCC): ICD-10-CM

## 2022-05-13 DIAGNOSIS — I77.9 PERIPHERAL ARTERIAL OCCLUSIVE DISEASE (HCC): ICD-10-CM

## 2022-05-13 DIAGNOSIS — I10 PRIMARY HYPERTENSION: ICD-10-CM

## 2022-05-13 PROCEDURE — 99214 OFFICE O/P EST MOD 30 MIN: CPT | Performed by: INTERNAL MEDICINE

## 2022-05-13 NOTE — PATIENT INSTRUCTIONS
Please complete your labs as soon as possible. You do NOT need to fast.     Please bring your machine into your next office visit to ensure it is accurate. I like the [employer] brand. Please keep the receipt. Please measure your blood pressure and pulse daily and record these values. Please measure your blood pressure two times in the morning and two times in the evening (1 minute apart) after when you have been relaxing for at least 5 minutes. Both feet should be flat on the ground and you should be seated. Please bring these values in at your next visit. Please call us if your blood pressure is greater than 130/80 on 3 occasions.

## 2022-07-18 RX ORDER — ATORVASTATIN CALCIUM 40 MG/1
40 TABLET, FILM COATED ORAL NIGHTLY
Qty: 90 TABLET | Refills: 0 | Status: SHIPPED | OUTPATIENT
Start: 2022-07-18

## 2022-09-19 ENCOUNTER — OFFICE VISIT (OUTPATIENT)
Dept: INTERNAL MEDICINE CLINIC | Facility: CLINIC | Age: 87
End: 2022-09-19
Payer: MEDICARE

## 2022-09-19 ENCOUNTER — LAB ENCOUNTER (OUTPATIENT)
Dept: LAB | Age: 87
End: 2022-09-19
Attending: INTERNAL MEDICINE

## 2022-09-19 VITALS
SYSTOLIC BLOOD PRESSURE: 126 MMHG | BODY MASS INDEX: 26.24 KG/M2 | TEMPERATURE: 98 F | OXYGEN SATURATION: 97 % | HEIGHT: 61 IN | WEIGHT: 139 LBS | RESPIRATION RATE: 18 BRPM | HEART RATE: 78 BPM | DIASTOLIC BLOOD PRESSURE: 62 MMHG

## 2022-09-19 DIAGNOSIS — F17.200 TOBACCO DEPENDENCE: ICD-10-CM

## 2022-09-19 DIAGNOSIS — I73.9 PVD (PERIPHERAL VASCULAR DISEASE) (HCC): ICD-10-CM

## 2022-09-19 DIAGNOSIS — I10 PRIMARY HYPERTENSION: ICD-10-CM

## 2022-09-19 DIAGNOSIS — R73.01 IMPAIRED FASTING GLUCOSE: ICD-10-CM

## 2022-09-19 DIAGNOSIS — I77.9 PERIPHERAL ARTERIAL OCCLUSIVE DISEASE (HCC): ICD-10-CM

## 2022-09-19 DIAGNOSIS — D49.2 ABNORMAL SKIN GROWTH: ICD-10-CM

## 2022-09-19 DIAGNOSIS — E78.5 HYPERLIPIDEMIA WITH TARGET LDL LESS THAN 70: ICD-10-CM

## 2022-09-19 DIAGNOSIS — Z00.00 ROUTINE GENERAL MEDICAL EXAMINATION AT A HEALTH CARE FACILITY: Primary | ICD-10-CM

## 2022-09-19 LAB
ALT SERPL-CCNC: 30 U/L
ANION GAP SERPL CALC-SCNC: 5 MMOL/L (ref 0–18)
AST SERPL-CCNC: 27 U/L (ref 15–37)
BUN BLD-MCNC: 24 MG/DL (ref 7–18)
CALCIUM BLD-MCNC: 9.3 MG/DL (ref 8.5–10.1)
CHLORIDE SERPL-SCNC: 113 MMOL/L (ref 98–112)
CHOLEST SERPL-MCNC: 120 MG/DL (ref ?–200)
CO2 SERPL-SCNC: 22 MMOL/L (ref 21–32)
CREAT BLD-MCNC: 0.95 MG/DL
EST. AVERAGE GLUCOSE BLD GHB EST-MCNC: 140 MG/DL (ref 68–126)
FASTING PATIENT LIPID ANSWER: YES
FASTING STATUS PATIENT QL REPORTED: YES
GFR SERPLBLD BASED ON 1.73 SQ M-ARVRAT: 57 ML/MIN/1.73M2 (ref 60–?)
GLUCOSE BLD-MCNC: 130 MG/DL (ref 70–99)
HBA1C MFR BLD: 6.5 % (ref ?–5.7)
HDLC SERPL-MCNC: 43 MG/DL (ref 40–59)
LDLC SERPL CALC-MCNC: 54 MG/DL (ref ?–100)
NONHDLC SERPL-MCNC: 77 MG/DL (ref ?–130)
OSMOLALITY SERPL CALC.SUM OF ELEC: 296 MOSM/KG (ref 275–295)
POTASSIUM SERPL-SCNC: 4.2 MMOL/L (ref 3.5–5.1)
SODIUM SERPL-SCNC: 140 MMOL/L (ref 136–145)
TRIGL SERPL-MCNC: 131 MG/DL (ref 30–149)
VLDLC SERPL CALC-MCNC: 19 MG/DL (ref 0–30)

## 2022-09-19 PROCEDURE — 84460 ALANINE AMINO (ALT) (SGPT): CPT

## 2022-09-19 PROCEDURE — 84450 TRANSFERASE (AST) (SGOT): CPT

## 2022-09-19 PROCEDURE — 83036 HEMOGLOBIN GLYCOSYLATED A1C: CPT

## 2022-09-19 PROCEDURE — 36415 COLL VENOUS BLD VENIPUNCTURE: CPT

## 2022-09-19 PROCEDURE — 80048 BASIC METABOLIC PNL TOTAL CA: CPT

## 2022-09-19 PROCEDURE — 80061 LIPID PANEL: CPT

## 2022-09-19 NOTE — PATIENT INSTRUCTIONS
I do advise you get the TDAP (tetanus, diptheriae, pertussis) vaccine every 10 years but it can cost up to $65.    I also advise you get the shingrix vaccine once in a lifetime. This is NEW and considered better than the previous shingles vaccine named, zostavax. It can cost up to $200. I advise you get the annual flu shot every September, October.

## 2023-06-01 ENCOUNTER — TELEPHONE (OUTPATIENT)
Dept: INTERNAL MEDICINE CLINIC | Facility: CLINIC | Age: 88
End: 2023-06-01

## 2023-06-01 DIAGNOSIS — I10 PRIMARY HYPERTENSION: Primary | ICD-10-CM

## 2023-06-01 DIAGNOSIS — I77.9 PERIPHERAL ARTERIAL OCCLUSIVE DISEASE (HCC): ICD-10-CM

## 2023-06-01 NOTE — TELEPHONE ENCOUNTER
Pt called stating her cardiologist has moved to [place] and she does not want to drive that far for an appt. Pt would like to know if  can recommend a new cardiologist that is closer.

## 2023-09-05 DIAGNOSIS — F32.A ANXIETY AND DEPRESSION: ICD-10-CM

## 2023-09-05 DIAGNOSIS — F41.9 ANXIETY AND DEPRESSION: ICD-10-CM

## 2023-09-05 DIAGNOSIS — Z72.0 TOBACCO ABUSE: ICD-10-CM

## 2023-09-05 DIAGNOSIS — E78.5 HYPERLIPIDEMIA WITH TARGET LDL LESS THAN 70: ICD-10-CM

## 2023-09-05 DIAGNOSIS — G62.9 NEUROPATHY: ICD-10-CM

## 2023-09-05 DIAGNOSIS — I77.9 PERIPHERAL ARTERIAL OCCLUSIVE DISEASE (HCC): ICD-10-CM

## 2023-09-05 RX ORDER — LISINOPRIL 40 MG/1
40 TABLET ORAL DAILY
Qty: 90 TABLET | Refills: 0 | Status: SHIPPED | OUTPATIENT
Start: 2023-09-05

## 2023-09-05 NOTE — TELEPHONE ENCOUNTER
LOV: 9/19/2022 with Dr. Anthonette Cabot  RTC: 1 year  Last Relevant Labs: 9/19/2022  Filled: 1/25/2022   #90 with 3 refills    Future Appointments   Date Time Provider Zabrina Rowe   9/11/2023  1:00 PM Deborah Kincaid MD [facility] [place]

## 2023-09-05 NOTE — TELEPHONE ENCOUNTER
Refill request.      lisinopril 40 MG    [facility]/pharmacy #[id number]- [place] - [address] [phone], [address] [place] [address].  [place] [id number]   Phone: [phone] Fax: [phone]   Hours: Not open 24 hours

## 2023-09-06 NOTE — TELEPHONE ENCOUNTER
Pt already scheduled for AWV. She will have labs completed prior to apt.     Future Appointments   Date Time Provider Zabrina Rowe   9/11/2023  1:00 PM Gustavo Bill MD [facility] [place]

## 2023-09-08 ENCOUNTER — LAB ENCOUNTER (OUTPATIENT)
Dept: LAB | Age: 88
End: 2023-09-08
Attending: INTERNAL MEDICINE
Payer: MEDICARE

## 2023-09-08 DIAGNOSIS — R73.01 IMPAIRED FASTING GLUCOSE: ICD-10-CM

## 2023-09-08 DIAGNOSIS — I10 PRIMARY HYPERTENSION: ICD-10-CM

## 2023-09-08 LAB
ALT SERPL-CCNC: 32 U/L
ANION GAP SERPL CALC-SCNC: 7 MMOL/L (ref 0–18)
AST SERPL-CCNC: 33 U/L (ref 15–37)
BUN BLD-MCNC: 9 MG/DL (ref 7–18)
CALCIUM BLD-MCNC: 9 MG/DL (ref 8.5–10.1)
CHLORIDE SERPL-SCNC: 111 MMOL/L (ref 98–112)
CO2 SERPL-SCNC: 22 MMOL/L (ref 21–32)
CREAT BLD-MCNC: 1.03 MG/DL
EGFRCR SERPLBLD CKD-EPI 2021: 51 ML/MIN/1.73M2 (ref 60–?)
EST. AVERAGE GLUCOSE BLD GHB EST-MCNC: 140 MG/DL (ref 68–126)
FASTING STATUS PATIENT QL REPORTED: NO
GLUCOSE BLD-MCNC: 138 MG/DL (ref 70–99)
HBA1C MFR BLD: 6.5 % (ref ?–5.7)
OSMOLALITY SERPL CALC.SUM OF ELEC: 291 MOSM/KG (ref 275–295)
POTASSIUM SERPL-SCNC: 4.1 MMOL/L (ref 3.5–5.1)
SODIUM SERPL-SCNC: 140 MMOL/L (ref 136–145)

## 2023-09-08 PROCEDURE — 83036 HEMOGLOBIN GLYCOSYLATED A1C: CPT

## 2023-09-08 PROCEDURE — 36415 COLL VENOUS BLD VENIPUNCTURE: CPT

## 2023-09-08 PROCEDURE — 84450 TRANSFERASE (AST) (SGOT): CPT

## 2023-09-08 PROCEDURE — 84460 ALANINE AMINO (ALT) (SGPT): CPT

## 2023-09-08 PROCEDURE — 80048 BASIC METABOLIC PNL TOTAL CA: CPT

## 2023-09-11 ENCOUNTER — OFFICE VISIT (OUTPATIENT)
Dept: INTERNAL MEDICINE CLINIC | Facility: CLINIC | Age: 88
End: 2023-09-11
Payer: MEDICARE

## 2023-09-11 VITALS
SYSTOLIC BLOOD PRESSURE: 140 MMHG | TEMPERATURE: 97 F | DIASTOLIC BLOOD PRESSURE: 40 MMHG | BODY MASS INDEX: 27.59 KG/M2 | HEART RATE: 54 BPM | HEIGHT: 60.25 IN | OXYGEN SATURATION: 98 % | WEIGHT: 142.38 LBS

## 2023-09-11 DIAGNOSIS — F32.A ANXIETY AND DEPRESSION: ICD-10-CM

## 2023-09-11 DIAGNOSIS — I10 PRIMARY HYPERTENSION: ICD-10-CM

## 2023-09-11 DIAGNOSIS — F41.9 ANXIETY AND DEPRESSION: ICD-10-CM

## 2023-09-11 DIAGNOSIS — E66.3 OVERWEIGHT (BMI 25.0-29.9): ICD-10-CM

## 2023-09-11 DIAGNOSIS — R73.01 IMPAIRED FASTING GLUCOSE: ICD-10-CM

## 2023-09-11 DIAGNOSIS — Z89.512 STATUS POST BELOW-KNEE AMPUTATION OF LEFT LOWER EXTREMITY (HCC): ICD-10-CM

## 2023-09-11 DIAGNOSIS — Z87.81 HISTORY OF FRACTURE OF LEFT HIP: ICD-10-CM

## 2023-09-11 DIAGNOSIS — Z72.0 TOBACCO ABUSE: ICD-10-CM

## 2023-09-11 DIAGNOSIS — Z00.00 ROUTINE GENERAL MEDICAL EXAMINATION AT A HEALTH CARE FACILITY: Primary | ICD-10-CM

## 2023-09-11 DIAGNOSIS — E78.5 HYPERLIPIDEMIA WITH TARGET LDL LESS THAN 70: ICD-10-CM

## 2023-09-11 DIAGNOSIS — G62.9 NEUROPATHY: ICD-10-CM

## 2023-09-11 DIAGNOSIS — F17.200 TOBACCO DEPENDENCE: ICD-10-CM

## 2023-09-11 DIAGNOSIS — Z78.0 POSTMENOPAUSAL: ICD-10-CM

## 2023-09-11 DIAGNOSIS — I73.9 PVD (PERIPHERAL VASCULAR DISEASE) (HCC): ICD-10-CM

## 2023-09-11 DIAGNOSIS — I77.9 PERIPHERAL ARTERIAL OCCLUSIVE DISEASE (HCC): ICD-10-CM

## 2023-09-11 PROBLEM — S72.002A CLOSED FRACTURE OF LEFT HIP (HCC): Status: RESOLVED | Noted: 2018-08-28 | Resolved: 2023-09-11

## 2023-09-11 RX ORDER — METOPROLOL TARTRATE 50 MG/1
50 TABLET, FILM COATED ORAL 2 TIMES DAILY
Qty: 270 TABLET | Refills: 3 | Status: SHIPPED | OUTPATIENT
Start: 2023-09-11

## 2023-09-11 RX ORDER — AMLODIPINE BESYLATE 10 MG/1
10 TABLET ORAL DAILY
Qty: 90 TABLET | Refills: 3 | Status: SHIPPED | OUTPATIENT
Start: 2023-09-11

## 2023-09-11 RX ORDER — ASPIRIN 81 MG/1
81 TABLET ORAL DAILY
Qty: 90 TABLET | Refills: 3 | Status: SHIPPED | OUTPATIENT
Start: 2023-09-11

## 2023-09-11 NOTE — PATIENT INSTRUCTIONS
Please start over the counter vitamin D3 2000 units every day for life. Please complete your DEXA (bone density) as soon as possible. Please bring your machine into your next office visit to ensure it is accurate. Please measure your blood pressure and pulse daily and record these values. Please measure your blood pressure once daily after you have been resting for at least 5 minutes. Both feet should be flat on the ground and you should be seated with your back supported. Please communicate your blood pressures to me in 14 days. I recommend the following vaccines -  TDAP (tetanus, diptheriae, pertussis) vaccine every 10 years. Shingrix vaccine once in a lifetime. It is a two step vaccine given 2-6 months apart. Annual flu shot every September, October. Stay up to date with COVID vaccines. RSV vaccine as soon as possible    You will be due for your repeat labs in March, 2024.  You do not need to fast.

## 2023-09-13 NOTE — PLAN OF CARE
Problem: Impaired Functional Mobility  Goal: Achieve highest/safest level of mobility/gait  Interventions:  - Assess patient's functional ability and stability  - Promote increasing activity/tolerance for mobility and gait  - Educate and engage patient/fam Subjective   Patient ID: Tana Hargrove is a 79 y.o. female who presents for Knee Injury (Fall 1 month ago still swollen had xrays and US).    HPI   She is here for hosp disch FU and also c/o knee pain. She was in the hosp from 08//23 for recurrent falls & anemia; and was dc'd to SNF for 2 wks and was discharged home on 09/05/23. She is getting BW once a wk; last one on 09/11/23 w/ Hgb of 7.5; follows with heme/onc. She is currently living with her son & dtr-in-law until she is sl better then moving to her own place. She cont to have pain & swelling on her R arthoplasty knee (off note h/o femur fx 2021); will be having home PT starting tmr; no more falls after hosp discharge. She uses walker for ambulation; reports she will be needing order for PT. She also has CKD stage IV, following with nephro and has upcoming appt. Her chronic problems been stable; taking all her meds as usual. Reports she is UTD on her meds now.       Review of Systems   Constitutional:  Negative for chills, fatigue, fever and unexpected weight change.   HENT: Negative.     Respiratory:  Negative for cough, shortness of breath and wheezing.    Cardiovascular:  Negative for chest pain, palpitations and leg swelling.   Gastrointestinal:  Negative for abdominal pain, constipation, diarrhea, nausea and vomiting.   Musculoskeletal: Negative.    Skin:  Negative for color change and rash.   Neurological:  Negative for dizziness and headaches.   Psychiatric/Behavioral:  Negative for behavioral problems and confusion.        Objective   /84 (BP Location: Right arm, Patient Position: Sitting, BP Cuff Size: Adult)   Pulse 74   Temp 36 °C (96.8 °F) (Temporal)   Resp 16   Wt 78.9 kg (174 lb)   SpO2 99%   BMI 32.88 kg/m²     Physical Exam  Vitals and nursing note reviewed.   Constitutional:       Appearance: Normal appearance.   Eyes:      Extraocular Movements: Extraocular movements intact.      Pupils: Pupils are equal, round, and  reactive to light.   Cardiovascular:      Rate and Rhythm: Normal rate and regular rhythm.      Pulses: Normal pulses.      Heart sounds: Normal heart sounds.   Pulmonary:      Effort: Pulmonary effort is normal. No respiratory distress.      Breath sounds: Normal breath sounds.   Abdominal:      General: Abdomen is flat. Bowel sounds are normal.      Palpations: Abdomen is soft.   Musculoskeletal:         General: Tenderness present. Normal range of motion.      Comments: R LE: mild swelling & tpp around the knee L LE; remote vertical incision on R knee from arthoplasty.    Neurological:      General: No focal deficit present.      Mental Status: She is alert and oriented to person, place, and time.   Psychiatric:         Mood and Affect: Mood normal.         Behavior: Behavior normal.       Assessment/Plan   She is here for hosp disch FU and also c/o knee pain. She was in the hosp from 08//23 for recurrent falls & anemia requiring transfusion and was dc'd to SNF for 2 wks and was discharged home on 09/05/23. She is doing okay post discharge; no more falls and will be getting home PT later this week; currently using walker for ambulation; will cont same and provide PT order as we receive. Cont to [facility] Heme/Onc for wkly bld drawn and possible transfusion. Cont to [facility] nephro as schd.  She cont to have swelling & pain at the arthroplasty site; will put referral to see ortho for further eval & mgmt. She is otherwise clinically & vitally stable.         Problem List Items Addressed This Visit       Degeneration of intervertebral disc of lumbar region    Stage 4 chronic kidney disease (CMS/HCC)    Type 2 diabetes mellitus without complications (CMS/HCC)     Other Visit Diagnoses       Hospital discharge follow-up    -  Primary    Pain and swelling of knee, right        Relevant Orders    Referral to Orthopaedic Surgery    Essential hypertension        Recurrent falls        Physical deconditioning              Rtc as  farhana Higgins MD   [facility], [facility]

## 2023-10-26 ENCOUNTER — TELEPHONE (OUTPATIENT)
Dept: INTERNAL MEDICINE CLINIC | Facility: CLINIC | Age: 88
End: 2023-10-26

## 2023-10-26 NOTE — TELEPHONE ENCOUNTER
Pts daughter came into the office to drop off pts BP readings     Placed in [address] fax folder for review

## 2023-11-10 DIAGNOSIS — I77.9 PERIPHERAL ARTERIAL OCCLUSIVE DISEASE (HCC): ICD-10-CM

## 2023-11-10 DIAGNOSIS — E78.5 HYPERLIPIDEMIA WITH TARGET LDL LESS THAN 70: ICD-10-CM

## 2023-11-10 DIAGNOSIS — Z72.0 TOBACCO ABUSE: ICD-10-CM

## 2023-11-10 DIAGNOSIS — F41.9 ANXIETY AND DEPRESSION: ICD-10-CM

## 2023-11-10 DIAGNOSIS — F32.A ANXIETY AND DEPRESSION: ICD-10-CM

## 2023-11-10 DIAGNOSIS — G62.9 NEUROPATHY: ICD-10-CM

## 2023-11-13 RX ORDER — LISINOPRIL 40 MG/1
40 TABLET ORAL DAILY
Qty: 90 TABLET | Refills: 3 | Status: SHIPPED | OUTPATIENT
Start: 2023-11-13

## 2024-03-18 ENCOUNTER — LAB ENCOUNTER (OUTPATIENT)
Dept: LAB | Age: 89
End: 2024-03-18
Attending: INTERNAL MEDICINE
Payer: MEDICARE

## 2024-03-18 DIAGNOSIS — E66.3 OVERWEIGHT (BMI 25.0-29.9): ICD-10-CM

## 2024-03-18 DIAGNOSIS — E78.5 HYPERLIPIDEMIA WITH TARGET LDL LESS THAN 70: ICD-10-CM

## 2024-03-18 DIAGNOSIS — R73.01 IMPAIRED FASTING GLUCOSE: ICD-10-CM

## 2024-03-18 DIAGNOSIS — I10 PRIMARY HYPERTENSION: ICD-10-CM

## 2024-03-18 DIAGNOSIS — Z89.512 STATUS POST BELOW-KNEE AMPUTATION OF LEFT LOWER EXTREMITY (HCC): ICD-10-CM

## 2024-03-18 DIAGNOSIS — Z00.00 ROUTINE GENERAL MEDICAL EXAMINATION AT A HEALTH CARE FACILITY: ICD-10-CM

## 2024-03-18 DIAGNOSIS — F17.200 TOBACCO DEPENDENCE: ICD-10-CM

## 2024-03-18 DIAGNOSIS — Z78.0 POSTMENOPAUSAL: ICD-10-CM

## 2024-03-18 DIAGNOSIS — Z87.81 HISTORY OF FRACTURE OF LEFT HIP: ICD-10-CM

## 2024-03-18 DIAGNOSIS — I73.9 PVD (PERIPHERAL VASCULAR DISEASE) (HCC): ICD-10-CM

## 2024-03-18 LAB
ALT SERPL-CCNC: 31 U/L
ANION GAP SERPL CALC-SCNC: 6 MMOL/L (ref 0–18)
AST SERPL-CCNC: 35 U/L (ref 15–37)
BUN BLD-MCNC: 16 MG/DL (ref 9–23)
CALCIUM BLD-MCNC: 9.7 MG/DL (ref 8.5–10.1)
CHLORIDE SERPL-SCNC: 111 MMOL/L (ref 98–112)
CO2 SERPL-SCNC: 24 MMOL/L (ref 21–32)
CREAT BLD-MCNC: 1.11 MG/DL
EGFRCR SERPLBLD CKD-EPI 2021: 47 ML/MIN/1.73M2 (ref 60–?)
EST. AVERAGE GLUCOSE BLD GHB EST-MCNC: 146 MG/DL (ref 68–126)
FASTING STATUS PATIENT QL REPORTED: YES
GLUCOSE BLD-MCNC: 130 MG/DL (ref 70–99)
HBA1C MFR BLD: 6.7 % (ref ?–5.7)
OSMOLALITY SERPL CALC.SUM OF ELEC: 295 MOSM/KG (ref 275–295)
POTASSIUM SERPL-SCNC: 3.8 MMOL/L (ref 3.5–5.1)
SODIUM SERPL-SCNC: 141 MMOL/L (ref 136–145)
VIT D+METAB SERPL-MCNC: 54.4 NG/ML (ref 30–100)

## 2024-03-18 PROCEDURE — 84460 ALANINE AMINO (ALT) (SGPT): CPT

## 2024-03-18 PROCEDURE — 82306 VITAMIN D 25 HYDROXY: CPT

## 2024-03-18 PROCEDURE — 36415 COLL VENOUS BLD VENIPUNCTURE: CPT

## 2024-03-18 PROCEDURE — 80048 BASIC METABOLIC PNL TOTAL CA: CPT

## 2024-03-18 PROCEDURE — 84450 TRANSFERASE (AST) (SGOT): CPT

## 2024-03-18 PROCEDURE — 83036 HEMOGLOBIN GLYCOSYLATED A1C: CPT

## 2024-03-26 ENCOUNTER — TELEPHONE (OUTPATIENT)
Dept: INTERNAL MEDICINE CLINIC | Facility: CLINIC | Age: 89
End: 2024-03-26

## 2024-03-26 NOTE — TELEPHONE ENCOUNTER
Patient called asking about labs regarding kidney test. Patient was informed the kidneys are stable compared to previous. Results could be due to hx of HTN, smoking versus other. Patient advised to further discuss during upcoming appointment.     Future Appointments   Date Time Provider Department Center   4/1/2024 11:30 AM Erum Callejas MD [facility] [place]   8/9/2024  2:00 PM Erum Callejas MD [facility] [place]

## 2024-04-01 ENCOUNTER — OFFICE VISIT (OUTPATIENT)
Dept: INTERNAL MEDICINE CLINIC | Facility: CLINIC | Age: 89
End: 2024-04-01
Payer: MEDICARE

## 2024-04-01 VITALS
OXYGEN SATURATION: 97 % | TEMPERATURE: 98 F | RESPIRATION RATE: 16 BRPM | WEIGHT: 138.81 LBS | HEIGHT: 60 IN | DIASTOLIC BLOOD PRESSURE: 48 MMHG | HEART RATE: 75 BPM | BODY MASS INDEX: 27.25 KG/M2 | SYSTOLIC BLOOD PRESSURE: 140 MMHG

## 2024-04-01 DIAGNOSIS — F41.1 GENERALIZED ANXIETY DISORDER: ICD-10-CM

## 2024-04-01 DIAGNOSIS — L29.9 ITCHING: Primary | ICD-10-CM

## 2024-04-01 DIAGNOSIS — I10 PRIMARY HYPERTENSION: ICD-10-CM

## 2024-04-01 DIAGNOSIS — E78.5 HYPERLIPIDEMIA WITH TARGET LDL LESS THAN 70: ICD-10-CM

## 2024-04-01 DIAGNOSIS — R73.01 IMPAIRED FASTING GLUCOSE: ICD-10-CM

## 2024-04-01 DIAGNOSIS — R79.89 HIGH SERUM VITAMIN D: ICD-10-CM

## 2024-04-01 PROCEDURE — G2211 COMPLEX E/M VISIT ADD ON: HCPCS | Performed by: INTERNAL MEDICINE

## 2024-04-01 PROCEDURE — 99214 OFFICE O/P EST MOD 30 MIN: CPT | Performed by: INTERNAL MEDICINE

## 2024-04-01 RX ORDER — CITALOPRAM HYDROBROMIDE 10 MG/1
10 TABLET ORAL DAILY
Qty: 90 TABLET | Refills: 3 | Status: SHIPPED | OUTPATIENT
Start: 2024-04-01

## 2024-04-01 RX ORDER — HYDROXYZINE HYDROCHLORIDE 10 MG/1
10 TABLET, FILM COATED ORAL 3 TIMES DAILY PRN
Qty: 30 TABLET | Refills: 0 | Status: SHIPPED | OUTPATIENT
Start: 2024-04-01

## 2024-04-01 NOTE — PROGRESS NOTES
Irina Rubio is a 89 year old female.     HPI:   Patient presents for the following issues. Comes in with Mariam, her neighbor.  HTN - home pressures are less than 140/90s.   Skin issues - she has a lady who gives her a shower who noticed some bruising a few months ago and patient was not aware. Mariam noticed a bruise over left shoulder 2 months ago. She lives with her son. She was picking at it a spot on her skin over that region. She was doing this over her chest and right shoulder too. She feels like it is a habit. She states her dermatologist told her to put desitin on it but family feels it is too thick and she has been using other lotions but she does not feel other lotions help. She has been using lotion \"obsessively\" per Mariam. She is also using it on her rectum. Per Mariam the dermatologist noted keratosis. Patient is worried about parasites. She is safe at home. Her son does not harm her.   Anxiety - \"I worry about the grandkids.\" Mariam also agrees she is anxious. We stopped citalopram in 2020 because patient had felt she did not need it. Family is also noticing some paranoia about her skin. Denies visual or auditory hallucinations. Denies SI/HI. She is not aggressive or combative. Has trouble staying asleep, has early rising at 3 am. Eating 3 meals per day but small portions. Denies hopeless.   History of left hip fracture - she knows she is due for imaging.       REVIEW OF SYSTEMS:   GENERAL HEALTH: feels well otherwise. No f/c  NEURO: denies any headaches, LH, dizzyness, LOC, falls  VISION: has poor vision chronically  RESPIRATORY: denies shortness of breath, cough, or congestion  CARDIOVASCULAR: denies chest pain, pressure or palpitations  GI: denies abdominal pain, constipation, diarrhea, n/v, BRBPR, melena  : no dysuria, or hematuria  PSYCH: mood is as above  EXT: denies edema         Wt Readings from Last 6 Encounters:   04/01/24 138 lb 12.8 oz (63 kg)   09/11/23 142 lb 6.4 oz (64.6 kg)   09/19/22  139 lb (63 kg)   05/13/22 138 lb 9.6 oz (62.9 kg)   01/25/22 139 lb (63 kg)   09/15/21 145 lb (65.8 kg)       Allergies   Allergen Reactions    Shellfish DIARRHEA and NAUSEA AND VOMITING       Family History   Problem Relation Age of Onset    Other (TB) Father     Cancer Paternal Grandmother         colon cancer    Cancer Daughter       Past Medical History:   Diagnosis Date    Anxiety state     occasional    Glaucoma     High blood pressure     High cholesterol     Hx of BKA (HCC)     left    Macular degeneration of both eyes     Other and unspecified hyperlipidemia     Ovarian cancer (HCC) 1962    R ovary removed, cannot remember for sure. No xrt/radiation    Peripheral vascular disease (HCC)     PVD (peripheral vascular disease) ([facility])     Unspecified essential hypertension     Visual impairment     macular degeneration      Past Surgical History:   Procedure Laterality Date    HYSTERECTOMY      1952    OTHER SURGICAL HISTORY Left     BKA for PVDz    OTHER SURGICAL HISTORY Left 09/2018    hip repair      Social History:    Social History     Socioeconomic History    Marital status:    Tobacco Use    Smoking status: Some Days     Packs/day: 0.50     Years: 58.00     Additional pack years: 0.00     Total pack years: 29.00     Types: Cigarettes    Smokeless tobacco: Never    Tobacco comments:     3-4 cig/daily since 2012   Vaping Use    Vaping Use: Never used   Substance and Sexual Activity    Alcohol use: Not Currently    Drug use: No   Other Topics Concern    Caffeine Concern Yes     Comment: 1 cup of coffee every morning    Stress Concern No    Weight Concern No    Special Diet No    Exercise No    Seat Belt Yes           EXAM:   /48   Pulse 75   Temp 98 °F (36.7 °C)   Resp 16   Ht 5' (1.524 m)   Wt 138 lb 12.8 oz (63 kg)   SpO2 97%   BMI 27.11 kg/m²   GENERAL: A&O well developed, well nourished,in no apparent distress  SKIN: no rashes,no suspicious lesions  HEENT: atraumatic, MMM, throat is  clear, left ear cerumen removed with currette and she tolerated procedure well. Normal TM b/l  NECK: supple, no jvd, no thyromegaly  LUNGS: clear to auscultation bilateraly, no c/w/r  CARDIO: RRR without g/m/r  GI: soft non tender nondistended no hsm bs throughout  NEURO: CN 2-12 grossly intact  PSYCH: pleasant  EXTREMITIES: no cyanosis, clubbing or edema  Rectal - small skin tag present at 12 o'clock. Surrounding skin is dry but no excoriations. Normal rectal tone. No stool or blood in vault  Chaperone Cookie Allen present      ASSESSMENT AND PLAN:   # Generalized Anxiety Disorder - resume citalopram and trial of hydroxyzine prn for itching.   # Itching and skin picking - due to above. No skin issues noted today on physical exam. Rx hydroxyzine  # Rectal itching - as above. Instructed to stop   # History of Left Hip Fracture after fall (2018) - Discussed fall prevention and importance of osteoporosis screening again. Agrees to DEXA today. Start vitamin D3.  # HTN: well controlled on metoprolol  75 mg BID, norvasc and lisinopril.   Renal function worsened on hctz.  # HLP: well controlled on atorvastatin   # PADz, s/p L BKA after failed PTA, no claudication:  Cont lipitor, metoprolol, ASA  # B/L Carotid Artery Disease: Cont w/med management including statin, ASA. No h/o CVA.    # Tobacco Dependence: she has been counseled repeatedly but remains ambivalent about cessation.   # LLE Neuropathy: since amputation. chronic, able to tolerate. Off gabapentin now  # Ovarian Cancer: s/p surgery in 1962 but cannot remember which side.   # Bilateral Glaucoma/Macular Degeneration: cont eye gtt and preservision tablets.   # Health Maintenance: medicare wellness exam on 9/11/2023   Bone Health: DEXA ordered again. Very important in light of her hip fracture.  Educated on calcium/vit D3, weight bearing exercises  Vaccines: up to date with prevnar 13 and pneumovac 23. Recommended RSV, TDAP, shingrix, covid and flu shots  Healthcare  Living Will, Medical POA:  Full code but no prolonged life support.   Lives with son but would want her daughter, Guillaume Metcalf would be POA.     Care Team:  Cardiology- Dr. Oropeza  Vascular- Dr. [facility]    The patient indicates understanding of these issues and agrees to the plan.  The patient is asked to return in 9/2024 for medicare wellness exam.   Erum Callejas MD

## 2024-04-01 NOTE — PATIENT INSTRUCTIONS
You are due for your bone density testing as soon as possible.    Your labs are due again in September, 2024.

## 2024-04-29 RX ORDER — ATORVASTATIN CALCIUM 40 MG/1
40 TABLET, FILM COATED ORAL NIGHTLY
Qty: 90 TABLET | Refills: 3 | Status: SHIPPED | OUTPATIENT
Start: 2024-04-29

## 2024-04-29 NOTE — TELEPHONE ENCOUNTER
ATORVASTATIN 40 MG TABLET          Will file in chart as: ATORVASTATIN 40 MG Oral Tab    Sig: TAKE 1 TABLET BY MOUTH EVERY DAY AT NIGHT    Disp: 90 tablet    Refills: 3    Start: 4/28/2024    Class: Normal    Non-formulary    Last ordered: 1 year ago (11/22/2022) by Erum Callejas MD    Last refill: 8/25/2023    Rx #: [id number]    Cholesterol Medication Protocol [id number]/2024 04:45 PM   Protocol Details Lipid panel within past 12 months    ALT < 80    ALT resulted within past year    In person appointment or virtual visit in the past 12 mos or appointment in next 3 mos      LOV 4/1/24  RTC 6 months  Filled 11/22/22 90 tabs 3 refills   Future Appointments   Date Time Provider Department Center   8/9/2024  2:00 PM Erum Callejas MD [facility] [place]

## 2024-08-08 ENCOUNTER — TELEPHONE (OUTPATIENT)
Dept: INTERNAL MEDICINE CLINIC | Facility: CLINIC | Age: 89
End: 2024-08-08

## 2024-08-09 ENCOUNTER — OFFICE VISIT (OUTPATIENT)
Dept: INTERNAL MEDICINE CLINIC | Facility: CLINIC | Age: 89
End: 2024-08-09
Payer: MEDICARE

## 2024-08-09 VITALS
DIASTOLIC BLOOD PRESSURE: 40 MMHG | RESPIRATION RATE: 18 BRPM | OXYGEN SATURATION: 97 % | HEART RATE: 60 BPM | BODY MASS INDEX: 25.95 KG/M2 | TEMPERATURE: 98 F | WEIGHT: 132.19 LBS | SYSTOLIC BLOOD PRESSURE: 144 MMHG | HEIGHT: 60 IN

## 2024-08-09 DIAGNOSIS — I77.9 PERIPHERAL ARTERIAL OCCLUSIVE DISEASE (HCC): ICD-10-CM

## 2024-08-09 DIAGNOSIS — G62.9 NEUROPATHY: ICD-10-CM

## 2024-08-09 DIAGNOSIS — J06.9 VIRAL URI WITH COUGH: Primary | ICD-10-CM

## 2024-08-09 DIAGNOSIS — I10 PRIMARY HYPERTENSION: ICD-10-CM

## 2024-08-09 DIAGNOSIS — Z72.0 TOBACCO ABUSE: ICD-10-CM

## 2024-08-09 DIAGNOSIS — E78.5 HYPERLIPIDEMIA WITH TARGET LDL LESS THAN 70: ICD-10-CM

## 2024-08-09 PROCEDURE — G2211 COMPLEX E/M VISIT ADD ON: HCPCS | Performed by: INTERNAL MEDICINE

## 2024-08-09 PROCEDURE — 99214 OFFICE O/P EST MOD 30 MIN: CPT | Performed by: INTERNAL MEDICINE

## 2024-08-09 RX ORDER — METOPROLOL TARTRATE 50 MG/1
50 TABLET, FILM COATED ORAL 2 TIMES DAILY
Qty: 270 TABLET | Refills: 3 | Status: SHIPPED | OUTPATIENT
Start: 2024-08-09

## 2024-08-09 RX ORDER — AMLODIPINE BESYLATE 10 MG/1
10 TABLET ORAL DAILY
Qty: 90 TABLET | Refills: 3 | Status: SHIPPED | OUTPATIENT
Start: 2024-08-09

## 2024-08-09 RX ORDER — ASPIRIN 81 MG/1
81 TABLET ORAL DAILY
Qty: 90 TABLET | Refills: 3 | Status: SHIPPED | OUTPATIENT
Start: 2024-08-09

## 2024-08-09 NOTE — PATIENT INSTRUCTIONS
Please complete your fasting labs in October, 2024.     I recommend the following vaccines -  TDAP (tetanus, diptheriae, pertussis) vaccine every 10 years.     Shingrix vaccine once in a lifetime. It is a two step vaccine given 2-6 months apart.     Annual FLU and RSV every September, October.    Stay up to date with COVID vaccines.

## 2024-08-09 NOTE — PROGRESS NOTES
Irina Rubio is a 89 year old female.     HPI:   Patient presents for the following issues. Comes in Mariam her neighbor and friend.   Goals of care - Mariam fills her pillbox and she administers medication to herself but Mariam thinks her vision difficulties could be from dropping her pills. Son, lives with her.   HTN - she brings in her wrist monitor. Her pressures are -150 and DBP 80-90  Illness - COVID test negtive last night. Her son was sick before her. She developed cough, runny nose, watery eyes, change in taste, poor appetite. This started a few weeks ago. Did not take any otc meds. She has many sick contacts  with COVID. No associated fevers or chills. No sore throat. Mild body aches for 1-2 days. Sheis currently sleeping through the night. She is breathing comfortably and her runny nose is improved.   Weight - she has lost weight because she has not been eating candy b/c she could not taste anything      REVIEW OF SYSTEMS:   GENERAL HEALTH: feels well otherwise. No f/c  NEURO: denies any headaches, LH, dizzyness, LOC, falls  VISION: denies any blurred or double vision  RESPIRATORY: see HPI  CARDIOVASCULAR: denies chest pain, pressure or palpitations  GI: denies abdominal pain, constipation, diarrhea, n/v, BRBPR, melena  : no dysuria,or hematuria  PSYCH: she is tolerating citalopram and Mariam thinks she is not picking her skin as much.   EXT: denies edema         Wt Readings from Last 6 Encounters:   04/01/24 138 lb 12.8 oz (63 kg)   09/11/23 142 lb 6.4 oz (64.6 kg)   09/19/22 139 lb (63 kg)   05/13/22 138 lb 9.6 oz (62.9 kg)   01/25/22 139 lb (63 kg)   09/15/21 145 lb (65.8 kg)       Allergies   Allergen Reactions    Shellfish DIARRHEA and NAUSEA AND VOMITING       Family History   Problem Relation Age of Onset    Other (TB) Father     Cancer Paternal Grandmother         colon cancer    Cancer Daughter       Past Medical History:    Anxiety state    occasional    Glaucoma    High blood pressure     High cholesterol    Hx of BKA (HCC)    left    Macular degeneration of both eyes    Other and unspecified hyperlipidemia    Ovarian cancer (HCC)    R ovary removed, cannot remember for sure. No xrt/radiation    Peripheral vascular disease (HCC)    PVD (peripheral vascular disease) (HCC)    Unspecified essential hypertension    Visual impairment    macular degeneration      Past Surgical History:   Procedure Laterality Date    Hysterectomy      1952    Other surgical history Left     BKA for PVDz    Other surgical history Left 09/2018    hip repair      Social History:    Social History     Socioeconomic History    Marital status:    Tobacco Use    Smoking status: Some Days     Current packs/day: 0.50     Average packs/day: 0.5 packs/day for 58.0 years (29.0 ttl pk-yrs)     Types: Cigarettes    Smokeless tobacco: Never    Tobacco comments:     3-4 cig/daily since 2012   Vaping Use    Vaping status: Never Used   Substance and Sexual Activity    Alcohol use: Not Currently    Drug use: No   Other Topics Concern    Caffeine Concern Yes     Comment: 1 cup of coffee every morning    Stress Concern No    Weight Concern No    Special Diet No    Exercise No    Seat Belt Yes             EXAM:   /40 (BP Location: Right arm)   Pulse 60   Temp 97.5 °F (36.4 °C) (Temporal)   Resp 18   Ht 5' (1.524 m)   Wt 132 lb 3.2 oz (60 kg)   SpO2 97%   BMI 25.82 kg/m²   GENERAL: A&O well developed, well nourished,in no apparent distress  SKIN: no rashes,no suspicious lesions  HEENT: atraumatic, MMM, throat is clear, edentulous,   NECK: supple, no jvd, no thyromegaly, no cervical lad, bilateral TM with   LUNGS: clear to auscultation bilateraly, no c/w/r  CARDIO: RRR without g/m/r  GI: soft non tender nondistended no hsm bs throughout  NEURO: CN 2-12 grossly intact  PSYCH: pleasant  EXTREMITIES: no cyanosis, clubbing or edema. LLE prosthesis, below-knee      ASSESSMENT AND PLAN:   # Viral URI with cough - discussed expectant  management. She is recovering.   # Generalized Anxiety Disorder - continue citalopram. He skin picking and itching has greatly improved.   # History of Left Hip Fracture after fall (2018) - Discussed fall prevention and importance of osteoporosis screening again. Still has not completed DEXA Start vitamin D3.  # HTN: well controlled on metoprolol  75 mg BID, norvasc and lisinopril.   Renal function worsened on hctz. Her wrist monitor is not accurate.   # HLP: well controlled on atorvastatin   # PADz, s/p L BKA after failed PTA, no claudication:  Cont lipitor, metoprolol, ASA  # B/L Carotid Artery Disease: Cont w/med management including statin, ASA. No h/o CVA.    # Tobacco Dependence: she has been counseled repeatedly but remains ambivalent about cessation.   # LLE Neuropathy: since amputation. chronic, able to tolerate. Off gabapentin now  # Ovarian Cancer: s/p surgery in 1962 but cannot remember which side.   # Bilateral Glaucoma/Macular Degeneration: cont eye gtt and preservision tablets.   # Health Maintenance: medicare wellness exam on 9/11/2023   Bone Health: DEXA ordered again. Very important in light of her hip fracture.  Educated on calcium/vit D3, weight bearing exercises  Vaccines: up to date with prevnar 13 and pneumovac 23. Recommended RSV, TDAP, shingrix, covid and flu shots  Healthcare Living Will, Medical POA:  Full code but no prolonged life support.   Lives with son but would want her daughter, Guillaume Metcalf would be POA.     Care Team:  Cardiology- Dr. Oropeza  Vascular- [facility]. [facility]    The patient indicates understanding of these issues and agrees to the plan.  The patient is asked to return in 2-3 months for medicare wellness exam.   Erum Callejas MD

## 2024-08-19 DIAGNOSIS — G62.9 NEUROPATHY: ICD-10-CM

## 2024-08-19 DIAGNOSIS — F32.A ANXIETY AND DEPRESSION: ICD-10-CM

## 2024-08-19 DIAGNOSIS — I77.9 PERIPHERAL ARTERIAL OCCLUSIVE DISEASE (HCC): ICD-10-CM

## 2024-08-19 DIAGNOSIS — Z72.0 TOBACCO ABUSE: ICD-10-CM

## 2024-08-19 DIAGNOSIS — F41.9 ANXIETY AND DEPRESSION: ICD-10-CM

## 2024-08-19 DIAGNOSIS — E78.5 HYPERLIPIDEMIA WITH TARGET LDL LESS THAN 70: ICD-10-CM

## 2024-08-19 RX ORDER — LISINOPRIL 40 MG/1
40 TABLET ORAL DAILY
Qty: 90 TABLET | Refills: 3 | Status: SHIPPED | OUTPATIENT
Start: 2024-08-19

## 2024-08-19 NOTE — TELEPHONE ENCOUNTER
lisinopril 40 MG Oral Tab         Sig: Take 1 tablet (40 mg total) by mouth daily.    Disp: 90 tablet    Refills: 3    Start: 8/19/2024    Class: Normal    Non-formulary For: Neuropathy, Tobacco abuse, Anxiety and depression, Hyperlipidemia with target LDL less than 70, Peripheral arterial occlusive disease (HCC)    To pharmacy: DX Code Needed  PER PT PLS SEND REFILLS ON RX.    Last ordered: 9 months ago (11/13/2023) by Erum Callejas MD    Hypertension Medications Protocol [id number]/19/2024 11:48 AM   Protocol Details Last BP reading less than 140/90    EGFRCR or GFRNAA > 50    CMP or BMP in past 12 months    In person appointment or virtual visit in the past 12 mos or appointment in next 3 mos      To be filled at: [facility]/pharmacy #[id number] - [place], [place] - [address] [phone], [phone]       LOV: 08/09/2024  RTC: 2-3 months  Labs: 03/18/2024  Last filled: 05/04/2024    No future appointments.

## 2024-11-07 ENCOUNTER — LAB ENCOUNTER (OUTPATIENT)
Dept: LAB | Age: 89
End: 2024-11-07
Attending: INTERNAL MEDICINE
Payer: MEDICARE

## 2024-11-07 DIAGNOSIS — R73.01 IMPAIRED FASTING GLUCOSE: ICD-10-CM

## 2024-11-07 DIAGNOSIS — I10 PRIMARY HYPERTENSION: ICD-10-CM

## 2024-11-07 DIAGNOSIS — E78.5 HYPERLIPIDEMIA WITH TARGET LDL LESS THAN 70: ICD-10-CM

## 2024-11-07 DIAGNOSIS — L29.9 ITCHING: ICD-10-CM

## 2024-11-07 DIAGNOSIS — F41.1 GENERALIZED ANXIETY DISORDER: ICD-10-CM

## 2024-11-07 LAB
ALT SERPL-CCNC: 38 U/L
ANION GAP SERPL CALC-SCNC: 7 MMOL/L (ref 0–18)
AST SERPL-CCNC: 43 U/L (ref ?–34)
BUN BLD-MCNC: 12 MG/DL (ref 9–23)
CALCIUM BLD-MCNC: 10 MG/DL (ref 8.7–10.4)
CHLORIDE SERPL-SCNC: 108 MMOL/L (ref 98–112)
CHOLEST SERPL-MCNC: 145 MG/DL (ref ?–200)
CO2 SERPL-SCNC: 26 MMOL/L (ref 21–32)
CREAT BLD-MCNC: 0.89 MG/DL
EGFRCR SERPLBLD CKD-EPI 2021: 61 ML/MIN/1.73M2 (ref 60–?)
EST. AVERAGE GLUCOSE BLD GHB EST-MCNC: 131 MG/DL (ref 68–126)
FASTING PATIENT LIPID ANSWER: YES
FASTING STATUS PATIENT QL REPORTED: YES
GLUCOSE BLD-MCNC: 123 MG/DL (ref 70–99)
HBA1C MFR BLD: 6.2 % (ref ?–5.7)
HDLC SERPL-MCNC: 45 MG/DL (ref 40–59)
LDLC SERPL CALC-MCNC: 72 MG/DL (ref ?–100)
NONHDLC SERPL-MCNC: 100 MG/DL (ref ?–130)
OSMOLALITY SERPL CALC.SUM OF ELEC: 293 MOSM/KG (ref 275–295)
POTASSIUM SERPL-SCNC: 4 MMOL/L (ref 3.5–5.1)
SODIUM SERPL-SCNC: 141 MMOL/L (ref 136–145)
TRIGL SERPL-MCNC: 161 MG/DL (ref 30–149)
VLDLC SERPL CALC-MCNC: 25 MG/DL (ref 0–30)

## 2024-11-07 PROCEDURE — 84460 ALANINE AMINO (ALT) (SGPT): CPT

## 2024-11-07 PROCEDURE — 80048 BASIC METABOLIC PNL TOTAL CA: CPT

## 2024-11-07 PROCEDURE — 84450 TRANSFERASE (AST) (SGOT): CPT

## 2024-11-07 PROCEDURE — 80061 LIPID PANEL: CPT

## 2024-11-07 PROCEDURE — 83036 HEMOGLOBIN GLYCOSYLATED A1C: CPT

## 2024-11-07 PROCEDURE — 36415 COLL VENOUS BLD VENIPUNCTURE: CPT

## 2024-11-13 ENCOUNTER — OFFICE VISIT (OUTPATIENT)
Dept: INTERNAL MEDICINE CLINIC | Facility: CLINIC | Age: 89
End: 2024-11-13
Payer: MEDICARE

## 2024-11-13 VITALS
HEART RATE: 63 BPM | TEMPERATURE: 97 F | HEIGHT: 60.7 IN | DIASTOLIC BLOOD PRESSURE: 74 MMHG | SYSTOLIC BLOOD PRESSURE: 120 MMHG | OXYGEN SATURATION: 96 % | BODY MASS INDEX: 25.89 KG/M2 | WEIGHT: 135.38 LBS

## 2024-11-13 DIAGNOSIS — E78.5 HYPERLIPIDEMIA WITH TARGET LDL LESS THAN 70: ICD-10-CM

## 2024-11-13 DIAGNOSIS — Z87.81 HISTORY OF HIP FRACTURE: ICD-10-CM

## 2024-11-13 DIAGNOSIS — R19.7 DIARRHEA: ICD-10-CM

## 2024-11-13 DIAGNOSIS — R73.01 IMPAIRED FASTING GLUCOSE: ICD-10-CM

## 2024-11-13 DIAGNOSIS — I10 HTN (HYPERTENSION): ICD-10-CM

## 2024-11-13 DIAGNOSIS — E78.5 HYPERLIPIDEMIA WITH TARGET LOW DENSITY LIPOPROTEIN (LDL) CHOLESTEROL LESS THAN 70 MG/DL: ICD-10-CM

## 2024-11-13 DIAGNOSIS — Z91.81 AT RISK FOR FALLS: ICD-10-CM

## 2024-11-13 DIAGNOSIS — F17.200 TOBACCO DEPENDENCE: ICD-10-CM

## 2024-11-13 DIAGNOSIS — Z89.512 STATUS POST BELOW-KNEE AMPUTATION OF LEFT LOWER EXTREMITY (HCC): ICD-10-CM

## 2024-11-13 DIAGNOSIS — I73.9 PVD (PERIPHERAL VASCULAR DISEASE) (HCC): ICD-10-CM

## 2024-11-13 DIAGNOSIS — Z00.00 ROUTINE GENERAL MEDICAL EXAMINATION AT A HEALTH CARE FACILITY: Primary | ICD-10-CM

## 2024-11-13 DIAGNOSIS — G62.9 NEUROPATHY: ICD-10-CM

## 2024-11-13 DIAGNOSIS — Z72.0 TOBACCO ABUSE: ICD-10-CM

## 2024-11-13 DIAGNOSIS — Z89.519 S/P BKA (BELOW KNEE AMPUTATION) (HCC): ICD-10-CM

## 2024-11-13 DIAGNOSIS — I77.9 PERIPHERAL ARTERIAL OCCLUSIVE DISEASE (HCC): ICD-10-CM

## 2024-11-13 DIAGNOSIS — Z78.0 POSTMENOPAUSAL: ICD-10-CM

## 2024-11-13 DIAGNOSIS — I10 PRIMARY HYPERTENSION: ICD-10-CM

## 2024-11-13 PROBLEM — E66.3 OVERWEIGHT (BMI 25.0-29.9): Status: RESOLVED | Noted: 2021-02-05 | Resolved: 2024-11-13

## 2024-11-13 PROCEDURE — 99214 OFFICE O/P EST MOD 30 MIN: CPT | Performed by: INTERNAL MEDICINE

## 2024-11-13 PROCEDURE — G0439 PPPS, SUBSEQ VISIT: HCPCS | Performed by: INTERNAL MEDICINE

## 2024-11-13 PROCEDURE — G0008 ADMIN INFLUENZA VIRUS VAC: HCPCS | Performed by: INTERNAL MEDICINE

## 2024-11-13 PROCEDURE — 90662 IIV NO PRSV INCREASED AG IM: CPT | Performed by: INTERNAL MEDICINE

## 2024-11-13 RX ORDER — LATANOPROST 50 UG/ML
1 SOLUTION/ DROPS OPHTHALMIC NIGHTLY
Qty: 7.5 ML | Refills: 0 | Status: SHIPPED | OUTPATIENT
Start: 2024-11-13

## 2024-11-13 RX ORDER — DORZOLAMIDE HYDROCHLORIDE AND TIMOLOL MALEATE 20; 5 MG/ML; MG/ML
1 SOLUTION/ DROPS OPHTHALMIC 2 TIMES DAILY
Qty: 10 ML | Refills: 0 | Status: SHIPPED | OUTPATIENT
Start: 2024-11-13

## 2024-11-13 NOTE — PATIENT INSTRUCTIONS
I recommend the following vaccines -  TDAP (tetanus, diptheriae, pertussis) vaccine every 10 years.     Shingrix vaccine once in a lifetime. It is a two step vaccine given 2-6 months apart.     RSV vaccine for everyone over age 75 and those ages 60-74 with chronic heart, lung, kidney and liver conditions.     Stay up to date with COVID vaccines.

## 2024-11-13 NOTE — PROGRESS NOTES
Irina Rubio is a 89 year old female.     HPI:   Patient presents for the following issues and medicare wellness exam. Comes in with Mariam, neighbor who is a nurse. They both deny any health concerns.   History of hip fracture - she still has not completed DEXA  PADz - no leg pain.   HTN - does n ot check pressures at home.   HLP - tolerating statin therapy  Vaccines  - flu shot today.   Glaucoma and MD - she has been out of her eye gtt because she is overdue for optho appt.   Anxiety and itching - much better on citalopram.   Impaired fasting glucose - eats a lot of candy.   Goals of care - she is not very active. Uses walker.   Tobacco dependence - not able to quit. She has a women bathe her once per week. She toilets independently.     REVIEW OF SYSTEMS:   GENERAL HEALTH: feels well otherwise. No f/c  NEURO: denies any headaches, LH, dizzyness, LOC. She fell last month. She did not move far enough back to sit on her bed. So she ended up on floor. She was able to get herself up. No pain or injury.   VISION: she is almost blind in both eyes.   RESPIRATORY: denies shortness of breath, cough, or congestion  CARDIOVASCULAR: denies chest pain, pressure or palpitations  GI: denies abdominal pain, constipation, diarrhea, n/v, BRBPR, melena  : no dysuria or hematuria   SKIN: denies any unusual skin lesions or rashes  PSYCH: mood is stable. Denies depression.   EXT: denies edema     Wt Readings from Last 6 Encounters:   08/09/24 132 lb 3.2 oz (60 kg)   04/01/24 138 lb 12.8 oz (63 kg)   09/11/23 142 lb 6.4 oz (64.6 kg)   09/19/22 139 lb (63 kg)   05/13/22 138 lb 9.6 oz (62.9 kg)   01/25/22 139 lb (63 kg)       Allergies   Allergen Reactions    Shellfish DIARRHEA and NAUSEA AND VOMITING       Family History   Problem Relation Age of Onset    Other (TB) Father     Cancer Paternal Grandmother         colon cancer    Cancer Daughter       Past Medical History:    Anxiety state    occasional    Glaucoma    High blood  pressure    High cholesterol    Hx of BKA (HCC)    left    Macular degeneration of both eyes    Other and unspecified hyperlipidemia    Ovarian cancer (HCC)    R ovary removed, cannot remember for sure. No xrt/radiation    Peripheral vascular disease (HCC)    PVD (peripheral vascular disease) (HCC)    Unspecified essential hypertension    Visual impairment    macular degeneration      Past Surgical History:   Procedure Laterality Date    Hysterectomy      1952    Other surgical history Left     BKA for PVDz    Other surgical history Left 09/2018    hip repair      Social History:    Social History     Socioeconomic History    Marital status:    Tobacco Use    Smoking status: Some Days     Current packs/day: 0.50     Average packs/day: 0.5 packs/day for 58.0 years (29.0 ttl pk-yrs)     Types: Cigarettes    Smokeless tobacco: Never    Tobacco comments:     3-4 cig/daily since 2012   Vaping Use    Vaping status: Never Used   Substance and Sexual Activity    Alcohol use: Not Currently    Drug use: No   Other Topics Concern    Caffeine Concern Yes     Comment: 1 cup of coffee every morning    Stress Concern No    Weight Concern No    Special Diet No    Exercise No    Seat Belt Yes           EXAM:   /74 (BP Location: Right arm, Patient Position: Sitting, Cuff Size: adult)   Pulse 63   Temp 97.3 °F (36.3 °C) (Temporal)   Ht 5' 0.7\" (1.542 m)   Wt 135 lb 6.4 oz (61.4 kg)   SpO2 96%   BMI 25.84 kg/m²   GENERAL: A&O well developed, well nourished,in no apparent distress  SKIN: no rashes,no suspicious lesions  HEENT: atraumatic, MMM, throat is clear  NECK: supple, no jvd, no thyromegaly  LUNGS: clear to auscultation bilateraly, no c/w/r  CARDIO: RRR without g/m/r  GI: soft non tender nondistended no hsm bs throughout  NEURO: CN 2-12 grossly intact  PSYCH: pleasant  EXTREMITIES: no cyanosis, clubbing, trace pitting edema of right ankle. 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 He skin picking and itching has greatly improved.   # History of Left Hip Fracture after fall (2018) - Discussed fall prevention and importance of osteoporosis screening again. Still has not completed DEXA, reminded again.   # HTN: well controlled on metoprolol  75 mg BID, norvasc and lisinopril.   Renal function worsened on hctz. Her wrist monitor is not accurate.   # HLP: well controlled on atorvastatin   # PADz, s/p L BKA after failed PTA, no claudication:  Cont lipitor, metoprolol, ASA  # B/L Carotid Artery Disease: Cont w/med management including statin, ASA. No h/o CVA.    # Tobacco Dependence: she has been counseled repeatedly but remains ambivalent about cessation.   # LLE Neuropathy: since amputation. chronic, able to tolerate. Off gabapentin now  # history of Ovarian Cancer: s/p surgery in 1962 but cannot remember which side.   # Bilateral Glaucoma/Macular Degeneration: cont eye gtt and preservision tablets.   # Health Maintenance: medicare wellness exam on 11/2024  Bone Health: DEXA ordered again. Very important in light of her hip fracture.  Educated on calcium/vit D3, weight bearing exercises.   Vaccines: up to date with prevnar 13 and pneumovac 23. Recommended RSV, TDAP, shingrix, covid and flu shots  Healthcare Living Will, Medical POA:  Full code but no prolonged life support.   Lives with son but would want her daughter, Guillaume Metcalf would be POA.     Care Team:  Cardiology- Dr. Oropeza  Vascular- [facility]    The patient indicates understanding of these issues and agrees to the plan.  The patient is asked to return in 1 year for medicare wellness exam.   Erum Callejas MD

## 2024-12-02 RX ORDER — LATANOPROST 50 UG/ML
1 SOLUTION/ DROPS OPHTHALMIC NIGHTLY
Qty: 7.5 ML | Refills: 0 | OUTPATIENT
Start: 2024-12-02

## 2024-12-02 NOTE — TELEPHONE ENCOUNTER
Name from pharmacy: LATANOPROST 0.005% EYE DROPS         Will file in chart as: LATANOPROST 0.005 % Ophthalmic Solution    Sig: INSTILL 1 DROP INTO BOTH EYES EVERY DAY AT NIGHT    Disp: 7.5 mL    Refills: 0 (Pharmacy requested: Not specified)    Start: 11/29/2024    Class: Normal    Non-formulary    Last ordered: 2 weeks ago (11/13/2024) by Erum Callejas MD    Last refill: 11/13/2024    Rx #: [id number]       To be filled at: [facility]/pharmacy #[id number] - [place], [place] - [address] [phone], [phone]     LOV:11/13/2024  RTC: 1 year   Labs:n/a   Last filled:11/13/2024  No future appointments.

## 2024-12-19 ENCOUNTER — TELEPHONE (OUTPATIENT)
Dept: INTERNAL MEDICINE CLINIC | Facility: CLINIC | Age: 89
End: 2024-12-19

## 2024-12-19 DIAGNOSIS — Z91.81 AT RISK FOR FALLS: ICD-10-CM

## 2024-12-19 DIAGNOSIS — Z89.519 S/P BKA (BELOW KNEE AMPUTATION) (HCC): ICD-10-CM

## 2024-12-19 DIAGNOSIS — Z72.0 TOBACCO ABUSE: ICD-10-CM

## 2024-12-19 DIAGNOSIS — E78.5 HYPERLIPIDEMIA WITH TARGET LDL LESS THAN 70: ICD-10-CM

## 2024-12-19 DIAGNOSIS — E78.5 HYPERLIPIDEMIA WITH TARGET LOW DENSITY LIPOPROTEIN (LDL) CHOLESTEROL LESS THAN 70 MG/DL: ICD-10-CM

## 2024-12-19 DIAGNOSIS — I10 HTN (HYPERTENSION): ICD-10-CM

## 2024-12-19 DIAGNOSIS — R19.7 DIARRHEA: ICD-10-CM

## 2024-12-19 DIAGNOSIS — I77.9 PERIPHERAL ARTERIAL OCCLUSIVE DISEASE (HCC): ICD-10-CM

## 2024-12-19 DIAGNOSIS — F32.A ANXIETY AND DEPRESSION: ICD-10-CM

## 2024-12-19 DIAGNOSIS — F41.1 GENERALIZED ANXIETY DISORDER: ICD-10-CM

## 2024-12-19 DIAGNOSIS — F41.9 ANXIETY AND DEPRESSION: ICD-10-CM

## 2024-12-19 DIAGNOSIS — G62.9 NEUROPATHY: ICD-10-CM

## 2024-12-19 RX ORDER — LATANOPROST 50 UG/ML
1 SOLUTION/ DROPS OPHTHALMIC NIGHTLY
Qty: 7.5 ML | Refills: 0 | Status: SHIPPED | OUTPATIENT
Start: 2024-12-19

## 2024-12-19 RX ORDER — ATORVASTATIN CALCIUM 40 MG/1
40 TABLET, FILM COATED ORAL NIGHTLY
Qty: 90 TABLET | Refills: 0 | Status: SHIPPED | OUTPATIENT
Start: 2024-12-19

## 2024-12-19 RX ORDER — METOPROLOL TARTRATE 50 MG
50 TABLET ORAL 2 TIMES DAILY
Qty: 270 TABLET | Refills: 0 | Status: SHIPPED | OUTPATIENT
Start: 2024-12-19

## 2024-12-19 RX ORDER — ASPIRIN 81 MG/1
81 TABLET ORAL DAILY
Qty: 90 TABLET | Refills: 0 | Status: SHIPPED | OUTPATIENT
Start: 2024-12-19

## 2024-12-19 RX ORDER — CITALOPRAM HYDROBROMIDE 10 MG/1
10 TABLET ORAL DAILY
Qty: 90 TABLET | Refills: 0 | Status: SHIPPED | OUTPATIENT
Start: 2024-12-19

## 2024-12-19 RX ORDER — AMLODIPINE BESYLATE 10 MG/1
10 TABLET ORAL DAILY
Qty: 90 TABLET | Refills: 0 | Status: SHIPPED | OUTPATIENT
Start: 2024-12-19

## 2024-12-19 RX ORDER — LISINOPRIL 40 MG/1
40 TABLET ORAL DAILY
Qty: 90 TABLET | Refills: 0 | Status: SHIPPED | OUTPATIENT
Start: 2024-12-19

## 2024-12-19 RX ORDER — DORZOLAMIDE HYDROCHLORIDE AND TIMOLOL MALEATE 20; 5 MG/ML; MG/ML
1 SOLUTION/ DROPS OPHTHALMIC 2 TIMES DAILY
Qty: 10 ML | Refills: 0 | Status: SHIPPED | OUTPATIENT
Start: 2024-12-19

## 2024-12-19 NOTE — TELEPHONE ENCOUNTER
citalopram 10 MG Oral Tab         Sig: Take 1 tablet (10 mg total) by mouth daily.    Disp: 90 tablet    Refills: 3    Start: 12/19/2024    Class: Normal    Non-formulary For: Generalized anxiety disorder    Last ordered: 8 months ago (4/1/2024) by Erum Callejas MD    Psychiatric Non-Scheduled (Anti-Anxiety) [id number]/19/2024 01:13 PM   Protocol Details In person appointment or virtual visit in the past 6 mos or appointment in next 3 mos    Depression Screening completed within the past 12 months       atorvastatin 40 MG Oral Tab         Sig: Take 1 tablet (40 mg total) by mouth nightly.    Disp: 90 tablet    Refills: 3    Start: 12/19/2024    Class: Normal    Non-formulary    Last ordered: 7 months ago (4/29/2024) by Erum Callejas MD    Cholesterol Medication Protocol [id number]/19/2024 01:13 PM   Protocol Details ALT < 80    ALT resulted within past year    Lipid panel within past 12 months    In person appointment or virtual visit in the past 12 mos or appointment in next 3 mos       amLODIPine 10 MG Oral Tab         Sig: Take 1 tablet (10 mg total) by mouth daily.    Disp: 90 tablet    Refills: 3    Start: 12/19/2024    Class: Normal    Non-formulary For: Hyperlipidemia with target LDL less than 70, Neuropathy, Tobacco abuse, Peripheral arterial occlusive disease (HCC)    To pharmacy: DX Code Needed  REFILL PLEASE.    Last ordered: 4 months ago (8/9/2024) by Erum Callejas MD    Hypertension Medications Protocol [id number]/19/2024 01:13 PM   Protocol Details CMP or BMP in past 12 months    Last BP reading less than 140/90    In person appointment or virtual visit in the past 12 mos or appointment in next 3 mos    EGFRCR or GFRNAA > 50       aspirin 81 MG Oral Tab EC         Sig: Take 1 tablet (81 mg total) by mouth daily.    Disp: 90 tablet    Refills: 3    Start: 12/19/2024    Class: Normal    Non-formulary    Last ordered: 4 months ago (8/9/2024) by Erum Callejas MD    Aspirin Protocol Passed 12/19/2024 01:13 PM    Protocol Details In person appointment or virtual visit in the past 6 mos or appointment in next 3 mos       metoprolol tartrate 50 MG Oral Tab         Sig: Take 1 tablet (50 mg total) by mouth 2 (two) times daily.    Disp: 270 tablet    Refills: 3    Start: 12/19/2024    Class: Normal    Non-formulary    Last ordered: 4 months ago (8/9/2024) by Erum Callejas MD    Hypertension Medications Protocol [id number]/19/2024 01:13 PM   Protocol Details CMP or BMP in past 12 months    Last BP reading less than 140/90    In person appointment or virtual visit in the past 12 mos or appointment in next 3 mos    EGFRCR or GFRNAA > 50       lisinopril 40 MG Oral Tab         Sig: Take 1 tablet (40 mg total) by mouth daily.    Disp: 90 tablet    Refills: 3    Start: 12/19/2024    Class: Normal    Non-formulary For: Hyperlipidemia with target LDL less than 70, Neuropathy, Tobacco abuse, Peripheral arterial occlusive disease (HCC), Anxiety and depression    To pharmacy: DX Code Needed  PER PT PLS SEND REFILLS ON RX.    Last ordered: 4 months ago (8/19/2024) by Erum Callejas MD    Hypertension Medications Protocol [id number]/19/2024 01:13 PM   Protocol Details CMP or BMP in past 12 months    Last BP reading less than 140/90    In person appointment or virtual visit in the past 12 mos or appointment in next 3 mos    EGFRCR or GFRNAA > 50       dorzolamide-timolol 2-0.5 % Ophthalmic Solution         Sig: Place 1 drop into both eyes 2 (two) times daily.    Disp: 10 mL    Refills: 0    Start: 12/19/2024    Class: Normal    Non-formulary For: Neuropathy, Tobacco abuse, Peripheral arterial occlusive disease (HCC), HTN (hypertension), Diarrhea, S/P BKA (below knee amputation) (HCC), Hyperlipidemia with target low density lipoprotein (LDL) cholesterol less than 70 mg/dL, At risk for falls    To pharmacy: Dispense QS x 90 days    Last ordered: 1 month ago (11/13/2024) by Erum Callejas MD        latanoprost 0.005 % Ophthalmic Solution         Sig:  Place 1 drop into both eyes nightly.    Disp: 7.5 mL    Refills: 0    Start: 12/19/2024    Class: Normal    Non-formulary    To pharmacy: Dispense QS x 90 days    Last ordered: 1 month ago (11/13/2024) by Erum Callejas MD       [id number] [place] [address] [address] [facility], [phone], [phone]     LOV:11/13/2024  RTC:1 year   Labs:11/07/2024   Last filled: 9/26/24, 11/05/24, 10/29/24, 08/06/24, 12/18/2024, 11/29/24, 11/13/24, 11/13/24  No future appointments.

## 2024-12-19 NOTE — TELEPHONE ENCOUNTER
Incoming call from curt requesting med refills - pt changed pharmacies  [address], [place] [address]

## 2024-12-19 NOTE — TELEPHONE ENCOUNTER
Call patient - her refills were sent to pharmacy but in future, her eye drop prescriptions should come from her eye doctor. If she has not seen her eye doctor in the past 6 months for monitoring of her eye pressure, she needs to do so ASAP.

## 2024-12-19 NOTE — TELEPHONE ENCOUNTER
Called and spoke with patient. She said she was unsure the last time she was at the eye doctor. She said she will check and if it has been longer than 6 months, she will make an appt after the holidays.

## 2024-12-21 DIAGNOSIS — I77.9 PERIPHERAL ARTERIAL OCCLUSIVE DISEASE: ICD-10-CM

## 2024-12-21 DIAGNOSIS — Z89.519 S/P BKA (BELOW KNEE AMPUTATION) (HCC): ICD-10-CM

## 2024-12-21 DIAGNOSIS — G62.9 NEUROPATHY: ICD-10-CM

## 2024-12-21 DIAGNOSIS — R19.7 DIARRHEA: ICD-10-CM

## 2024-12-21 DIAGNOSIS — Z91.81 AT RISK FOR FALLS: ICD-10-CM

## 2024-12-21 DIAGNOSIS — Z72.0 TOBACCO ABUSE: ICD-10-CM

## 2024-12-21 DIAGNOSIS — I10 HTN (HYPERTENSION): ICD-10-CM

## 2024-12-21 DIAGNOSIS — E78.5 HYPERLIPIDEMIA WITH TARGET LOW DENSITY LIPOPROTEIN (LDL) CHOLESTEROL LESS THAN 70 MG/DL: ICD-10-CM

## 2024-12-23 RX ORDER — DORZOLAMIDE HYDROCHLORIDE AND TIMOLOL MALEATE 20; 5 MG/ML; MG/ML
1 SOLUTION/ DROPS OPHTHALMIC 2 TIMES DAILY
Qty: 10 ML | Refills: 0 | OUTPATIENT
Start: 2024-12-23

## 2024-12-23 NOTE — TELEPHONE ENCOUNTER
No protocol     LOV: 11/13/24   RTC: 1 yr   Filled: 12/19/24 #10mL  Labs: 11/7/24   No future appointments.

## 2024-12-31 DIAGNOSIS — E78.5 HYPERLIPIDEMIA WITH TARGET LOW DENSITY LIPOPROTEIN (LDL) CHOLESTEROL LESS THAN 70 MG/DL: ICD-10-CM

## 2024-12-31 DIAGNOSIS — Z91.81 AT RISK FOR FALLS: ICD-10-CM

## 2024-12-31 DIAGNOSIS — Z72.0 TOBACCO ABUSE: ICD-10-CM

## 2024-12-31 DIAGNOSIS — R19.7 DIARRHEA: ICD-10-CM

## 2024-12-31 DIAGNOSIS — G62.9 NEUROPATHY: ICD-10-CM

## 2024-12-31 DIAGNOSIS — Z89.519 S/P BKA (BELOW KNEE AMPUTATION) (HCC): ICD-10-CM

## 2024-12-31 DIAGNOSIS — I77.9 PERIPHERAL ARTERIAL OCCLUSIVE DISEASE (HCC): ICD-10-CM

## 2024-12-31 DIAGNOSIS — I10 HTN (HYPERTENSION): ICD-10-CM

## 2024-12-31 RX ORDER — DORZOLAMIDE HYDROCHLORIDE AND TIMOLOL MALEATE 20; 5 MG/ML; MG/ML
1 SOLUTION/ DROPS OPHTHALMIC 2 TIMES DAILY
Qty: 10 ML | Refills: 0 | OUTPATIENT
Start: 2024-12-31

## 2025-03-01 DIAGNOSIS — F41.1 GENERALIZED ANXIETY DISORDER: ICD-10-CM

## 2025-03-02 DIAGNOSIS — I77.9 PERIPHERAL ARTERIAL OCCLUSIVE DISEASE: ICD-10-CM

## 2025-03-02 DIAGNOSIS — F32.A ANXIETY AND DEPRESSION: ICD-10-CM

## 2025-03-02 DIAGNOSIS — E78.5 HYPERLIPIDEMIA WITH TARGET LDL LESS THAN 70: ICD-10-CM

## 2025-03-02 DIAGNOSIS — F41.9 ANXIETY AND DEPRESSION: ICD-10-CM

## 2025-03-02 DIAGNOSIS — Z72.0 TOBACCO ABUSE: ICD-10-CM

## 2025-03-02 DIAGNOSIS — G62.9 NEUROPATHY: ICD-10-CM

## 2025-03-03 RX ORDER — CITALOPRAM HYDROBROMIDE 10 MG/1
10 TABLET ORAL DAILY
Qty: 90 TABLET | Refills: 3 | Status: SHIPPED | OUTPATIENT
Start: 2025-03-03

## 2025-03-03 RX ORDER — ATORVASTATIN CALCIUM 40 MG/1
40 TABLET, FILM COATED ORAL NIGHTLY
Qty: 90 TABLET | Refills: 0 | Status: SHIPPED | OUTPATIENT
Start: 2025-03-03

## 2025-03-03 RX ORDER — ASPIRIN 81 MG/1
TABLET ORAL DAILY
Qty: 90 TABLET | Refills: 0 | Status: SHIPPED | OUTPATIENT
Start: 2025-03-03

## 2025-03-03 RX ORDER — LISINOPRIL 40 MG/1
40 TABLET ORAL DAILY
Qty: 90 TABLET | Refills: 0 | Status: SHIPPED | OUTPATIENT
Start: 2025-03-03

## 2025-03-03 RX ORDER — AMLODIPINE BESYLATE 10 MG/1
10 TABLET ORAL DAILY
Qty: 90 TABLET | Refills: 0 | Status: SHIPPED | OUTPATIENT
Start: 2025-03-03

## 2025-03-03 NOTE — TELEPHONE ENCOUNTER
Protocol failed    LOV: 11/13/24   RTC: 1 yr   Filled: 12/19/24 #90   Labs: 11/7/24   No future appointments.

## 2025-03-03 NOTE — TELEPHONE ENCOUNTER
Protocol passed     LOV: 11/13/24   RTC: 1 yr   Filled: 12/19/24 #90 0 refill   Labs: 11/7/24   No future appointments.

## 2025-06-10 DIAGNOSIS — Z72.0 TOBACCO ABUSE: ICD-10-CM

## 2025-06-10 DIAGNOSIS — E78.5 HYPERLIPIDEMIA WITH TARGET LDL LESS THAN 70: ICD-10-CM

## 2025-06-10 DIAGNOSIS — F41.9 ANXIETY AND DEPRESSION: ICD-10-CM

## 2025-06-10 DIAGNOSIS — I77.9 PERIPHERAL ARTERIAL OCCLUSIVE DISEASE: ICD-10-CM

## 2025-06-10 DIAGNOSIS — G62.9 NEUROPATHY: ICD-10-CM

## 2025-06-10 DIAGNOSIS — F32.A ANXIETY AND DEPRESSION: ICD-10-CM

## 2025-06-11 RX ORDER — LISINOPRIL 40 MG/1
40 TABLET ORAL DAILY
Qty: 90 TABLET | Refills: 0 | Status: SHIPPED | OUTPATIENT
Start: 2025-06-11

## 2025-06-11 RX ORDER — ATORVASTATIN CALCIUM 40 MG/1
40 TABLET, FILM COATED ORAL NIGHTLY
Qty: 90 TABLET | Refills: 0 | Status: SHIPPED | OUTPATIENT
Start: 2025-06-11

## 2025-06-11 NOTE — TELEPHONE ENCOUNTER
Protocol passed     LOV: 11/13/24   # HTN: well controlled on metoprolol  75 mg BID, norvasc and lisinopril.   Renal function worsened on hctz. Her wrist monitor is not accurate.   # HLP: well controlled on atorvastatin   RTC: 1 yr   Filled: 3/3/25 #90   Labs: 11/7/24   No future appointments.

## 2025-07-15 RX ORDER — LATANOPROST 50 UG/ML
1 SOLUTION/ DROPS OPHTHALMIC NIGHTLY
Qty: 7.5 ML | Refills: 0 | OUTPATIENT
Start: 2025-07-15

## 2025-07-21 NOTE — TELEPHONE ENCOUNTER
Protocol: Failed  Last Office Visit: 11/13/24  Labs: Completed in November  Last Refill: 3/3/25 #90  Return to Clinic:   Future Appointments   Date Time Provider Department Center   11/14/2025  2:00 PM Erum Callejas MD [facility] [place]

## 2025-07-22 RX ORDER — ASPIRIN 81 MG/1
81 TABLET ORAL DAILY
Qty: 90 TABLET | Refills: 1 | Status: SHIPPED | OUTPATIENT
Start: 2025-07-22

## (undated) DEVICE — PAD SACRAL SPAN AID

## (undated) DEVICE — SOL  .9 1000ML BTL

## (undated) DEVICE — SOL  .9 3000ML

## (undated) DEVICE — Device: Brand: STABLECUT®

## (undated) DEVICE — SUTURE VICRYL 2-0 CP-1

## (undated) DEVICE — SUTURE ETHIBOND 5 V-37

## (undated) DEVICE — STERILE POLYISOPRENE POWDER-FREE SURGICAL GLOVES: Brand: PROTEXIS

## (undated) DEVICE — SUTURE VICRYL 0 CP-1

## (undated) DEVICE — KENDALL SCD EXPRESS SLEEVES, KNEE LENGTH, MEDIUM: Brand: KENDALL SCD

## (undated) DEVICE — 2C14 #2 PDO 45 X 45: Brand: 2C14 #2 PDO 45 X 45

## (undated) DEVICE — STOCKINETTE HYDROMED 8X6

## (undated) DEVICE — DRESSING AQUACEL AG 3.5 X 10

## (undated) DEVICE — 3M™ STERI-DRAPE™ U-DRAPE 1015: Brand: STERI-DRAPE™

## (undated) DEVICE — SUTURE ETHIBOND 1 OS-6

## (undated) DEVICE — REM POLYHESIVE ADULT PATIENT RETURN ELECTRODE: Brand: VALLEYLAB

## (undated) DEVICE — TOTAL HIP CDS: Brand: MEDLINE INDUSTRIES, INC.

## (undated) DEVICE — 1010 S-DRAPE TOWEL DRAPE 10/BX: Brand: STERI-DRAPE™

## (undated) NOTE — MR AVS SNAPSHOT
[place]  [address]  [address] [facility] [address] [phone]               Thank you for choosing us for your health care visit with Chavez Puente MD.  We are glad to serve you and happy to provide you with this summ Please have your blood-work done as soon as possible.     You can return in November, 2017 for a medicare wellness exam.        Allergies as of May 01, 2017     Shellfish Diarrhea, Nausea and vomiting                Today's Vital Signs     BP Pulse Temp Hei [email or web address]

## (undated) NOTE — LETTER
09/03/20        [address] [place] [address] [address]      Dear [facility],    [address] [facility] records indicate that you have outstanding lab work and or testing that was ordered for you and has not yet been completed:  Orders Plac

## (undated) NOTE — LETTER
10/15/21        [address]  [address] [address]      Dear Joey Nuñez,    [address] [facility] records indicate that you have outstanding lab work and or testing that was ordered for you and has not yet been completed:  Orders Plac

## (undated) NOTE — IP AVS SNAPSHOT
Patient Demographics     Address  [address] [place] [address] [facility] [address] Phone  [phone] (Home) *Preferred*      Emergency Contact(s)     Name Relation Home Work [place]    Chino Ordaz Son   [phone]    Anila Dong Daughter   [phone] after surgery. Discuss this at follow-up office visit. ? Not allowed while taking narcotic pain medication or muscle relaxants. Sex  ? Usually allowed after four to six weeks – check with surgeon at your office visit. Return to work  ?  Usually ? Usually you will be on a blood thinner for about 4-5 weeks. ? Contact your physician if you have signs of bruising, nose bleeds or blood in your urine. Use electric razors and soft toothbrushes only.   ? Do not take aspirin while taking blood thinners un ? Use stool softeners such as Colace or Senakot while on narcotics, and laxatives such as Miralax or Milk of Magnesia if needed. ? An enema or suppository may be needed if above measures do not work.       Prevention of infection and promotion of healing Notify your surgeon if you notice any of the following signs  ? Separation of incision line. ? Increased redness, swelling, or warmth of skin around incision. ? Increased or foul smelling drainage from incision  ? Red streaks on skin near incision. ?  Te the same service. ([facility] [place] have this service; if you live in another [facility], you may check with them as well).  You need space to open car doors to position yourself properly with walker to get in and out of your car safely; some Notes to patient:  Last dose at 1 PM 08/31/2018      Take 1-2 tablets by mouth every 4 (four) hours as needed for Pain. Alexis Caal MD         latanoprost 0.005 % Soln  Commonly known as:  XALATAN      Place 1 drop into both eyes nightly.           lis [id number] acetaminophen (TYLENOL) tab 650 mg 08/30/18 1705 Given      [id number] acetaminophen (TYLENOL) tab 650 mg 08/30/18 2117 Given      [id number] acetaminophen (TYLENOL) tab 650 mg 08/31/18 0921 Given      [id number] apixaban (ELIQUIS) tab 2.5 mg 08/30 H[facility] 8.9 12.0 - 16.0 g/dL [facility]   HCT 26.8 34.0 - 50.0 % [facility]   .0 150.0 - 450.0 10(3)uL — [facility]   MCV 95.0 81.0 - 100.0 fL — [facility]   MCH 31.6 27.0 - 33.2 pg — [facility]   MCHC 33.2 31.0 - 37.0 g/dL — [facility]   RDW 13.4 1 [address] Patient Status:  Emergency    1932 MRN [id number]   Location [address] Attending Veto Tracy MD   Hosp Day # 0 PCP Nicholas Lewis MD     Chief Complaint: fall    History of Present Illness: Sasha Tomas atorvastatin 40 MG Oral Tab TAKE 1 TABLET BY MOUTH ONE TIME A DAY Disp: 90 tablet Rfl: 3   aspirin 325 MG Oral Tab Take 1 tablet by mouth every morning. Disp:  Rfl:    CITALOPRAM HYDROBROMIDE 10 MG Oral Tab TAKE 1 TABLET (10 MG TOTAL) BY MOUTH DAILY.  Disp: CrCl cannot be calculated (Patient's most recent lab result is older than the maximum 7 days allowed. ). No results for input(s): PTP, INR in the last 168 hours. No results for input(s): TROP, CK in the last 168 hours.     Imaging: Imaging data reviewe other injuries. She was brought to the emergency room. An x-ray was obtained. She was noted to have a displaced left femoral neck fracture. I reviewed the x-rays and recommended a left cemented bipolar hip replacement.   The procedure has been explained d: 08/29/2018 14:24:30  t: 08/29/2018 14:48:49  Job [id number]  KFW/    cc: NICCI Montes MD Truett Fortis, M.D.  [KW.1]  Electronically signed by Kuldip Anand MD on 8/29/2018  3:59 PM   Attribution Campbell    KW.1 - Polo Richardson • PVD (peripheral vascular disease) (HCC)    • Unspecified essential hypertension    • Visual impairment     macular degeneration[NP.2]       Past Surgical History[NP.1]  Past Surgical History:  No date: HYSTERECTOMY      Comment: 1952  No date: OTHER SURG PT Approx Degree of Impairment Score: 50.57%   Standardized Score (AM-PAC Scale): 42.13   CMS Modifier (G-Code): CK[NP.2]    FUNCTIONAL ABILITY STATUS  Gait Assessment[NP.1]   Gait Assistance: Maximum assistance  Distance (ft): 100 ft with 1 seated rest du Patient is functioning below baseline. Patient will continue to benefit by PT to address above stated deficits while admitted.     DISCHARGE RECOMMENDATIONS[NP.1]  PT Discharge Recommendations: Sub-acute rehabilitation (ELOS : 14 to 19 days)[NP.2] The AM-PA Type of Evaluation: Initial  Physician Order: PT Eval and Treat    Presenting Problem: S/p Left Bipolar Cemented hip Replacement on 08/29/18  Reason for Therapy: Mobility Dysfunction and Discharge Planning    History related to current admission: Patient i \" My old prosthesis was fitting better than new one. \" Patient was participatory & motivated. Son was present initially though left after short time. Patient self-stated goal is to be able to walk independently with Rw.     OBJECTIVE  Precautions: PATY - -   Moving to and from a bed to a chair (including a wheelchair)?: A Little   -   Need to walk in hospital room?: A Little   -   Climbing 3-5 steps with a railing?: A Lot       AM-PAC Score:  Raw Score: 17   PT Approx Degree of Impairment Score: 50.57%   S Cemented hip Replacement on 08/29/18. Pertinent comorbidities and personal factors impacting therapy include PVD,Left BKA,HTN, Anxiety.   In this PT evaluation, the patient presents with the following impairments decreased ROM & strength in left hip,minima Goal #4 Patient to be able to recall 3/3 posterior hip precautions independently   Goal #5    Goal #6    Goal Comments: Goals established on 8/30/2018[NP.1]     Attribution Key    NP. 1 - Jolly Vargas, PT on 8/30/2018 12:10 PM                        Occ • Macular degeneration of both eyes    • Other and unspecified hyperlipidemia    • Ovarian cancer ([address] 1962    R ovary removed, cannot remember for sure.  No xrt/radiation   • Peripheral vascular disease ([address]    • PVD (peripheral vascular disease) ([address] Communication: WFL    Behavioral/Emotional/Social: WFL    RANGE OF MOTION AND STRENGTH ASSESSMENT  Upper extremity ROM is within functional limits     Upper extremity strength is within functional limits     COORDINATION  Gross Motor    [facility]    Fine Motor dressing future session; standing via RW and min assist; functional mobility to robin and back to bedside chair via RW, min assist, increased time and chair follow for safety; sitting via CGA and safety cues for hand placement.  Patient also educated on OT r The patient is functioning below her previous functional level and would benefit from skilled inpatient OT to address the above deficits, maximizing patient’s ability to return safely to her prior level of function.     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

## (undated) NOTE — LETTER
[facility]  [address] [facility], [address] [place] [address]    Consent for Operation    Date: __________________    Time: _______________    1.  I authorize the performance upon Fela Lozoya the following operation:    Procedure(s):  LEFT BIPLOAR HIP videotape. [facility] will not be responsible for storage or maintenance of this tape. 6. For the purpose of advancing medical education, I consent to the admittance of observers to the Operating Room.     7. I authorize the use of any specimen, organs Signature of Patient:   ___________________________    When the patient is a minor or mentally incompetent to give consent:  Signature of person authorized to consent for patient: ___________________________   Relationship to patient: _____________________ drugs/illegal medications). Failure to inform my anesthesiologist about these medicines may increase my risk of anesthetic complications. · If I am allergic to anything or have had a reaction to anesthesia before.     3. I understand how the anesthesia med I have discussed the procedure and information above with the patient (or patient’s representative) and answered their questions. The patient or their representative has agreed to have anesthesia services.     _______________________________________________

## (undated) NOTE — IP AVS SNAPSHOT
[address]            (For Outpatient Use Only) Initial Admit Date: 8/28/2018   Inpt/Obs Admit Date: Inpt: 8/28/18 / Obs: N/A   Discharge Date:    Poly Later:  [de-identified]   MRN: [de-identified]   CSN: [id number]        ENCOUNTER  Patient Group Number:  Insurance Type:    Subscriber Name:  Subscriber :    Subscriber ID:  Pt Rel to Subscriber:    Hospital Account Financial Class: Medicare    2018